# Patient Record
Sex: MALE | Race: WHITE | NOT HISPANIC OR LATINO | Employment: OTHER | URBAN - NONMETROPOLITAN AREA
[De-identification: names, ages, dates, MRNs, and addresses within clinical notes are randomized per-mention and may not be internally consistent; named-entity substitution may affect disease eponyms.]

---

## 2017-01-10 ENCOUNTER — OFFICE VISIT (OUTPATIENT)
Dept: FAMILY MEDICINE CLINIC | Facility: CLINIC | Age: 82
End: 2017-01-10

## 2017-01-10 VITALS
HEIGHT: 72 IN | HEART RATE: 71 BPM | SYSTOLIC BLOOD PRESSURE: 130 MMHG | DIASTOLIC BLOOD PRESSURE: 80 MMHG | WEIGHT: 209 LBS | BODY MASS INDEX: 28.31 KG/M2 | OXYGEN SATURATION: 95 %

## 2017-01-10 DIAGNOSIS — G25.81 RESTLESS LEG: Primary | ICD-10-CM

## 2017-01-10 DIAGNOSIS — J31.0 CHRONIC RHINITIS: ICD-10-CM

## 2017-01-10 PROBLEM — C61 PROSTATE CANCER (HCC): Status: ACTIVE | Noted: 2017-01-10

## 2017-01-10 PROCEDURE — 99213 OFFICE O/P EST LOW 20 MIN: CPT | Performed by: FAMILY MEDICINE

## 2017-01-10 RX ORDER — AMITRIPTYLINE HYDROCHLORIDE 25 MG/1
25 TABLET, FILM COATED ORAL NIGHTLY
Qty: 30 TABLET | Refills: 5 | Status: SHIPPED | OUTPATIENT
Start: 2017-01-10 | End: 2017-02-27

## 2017-01-10 RX ORDER — FLUTICASONE PROPIONATE 50 MCG
SPRAY, SUSPENSION (ML) NASAL
Qty: 1 EACH | Refills: 5 | Status: SHIPPED | OUTPATIENT
Start: 2017-01-10 | End: 2017-08-10

## 2017-01-10 RX ORDER — TAMSULOSIN HYDROCHLORIDE 0.4 MG/1
0.4 CAPSULE ORAL DAILY
COMMUNITY
Start: 2016-12-23 | End: 2017-02-27 | Stop reason: SDUPTHER

## 2017-01-10 NOTE — PROGRESS NOTES
Subjective   Mathew Ely is a 83 y.o. male.     History of Present Illness   83-year-old male with a classic history of pain in both legs at night.  Getting up and walking seems to help.  He brought this to the attention of his urologist, who is now treating him for prostate cancer.  He was advised to see me today.    Chronic rhinitis.  Fluticasone helps.  States he needs a refill.    The following portions of the patient's history were reviewed and updated as appropriate: allergies, current medications, past family history, past medical history, past social history, past surgical history and problem list.    Review of Systems   Constitutional: Negative for activity change, chills and fever.   HENT: Positive for postnasal drip and rhinorrhea.    Musculoskeletal: Positive for myalgias.   Skin: Negative for rash.       Objective   Physical Exam   Constitutional: He is oriented to person, place, and time. He appears well-nourished.   HENT:   Right Ear: External ear normal.   Left Ear: External ear normal.   Eyes: EOM are normal.   Neck: Neck supple.   Pulmonary/Chest: Effort normal.   Neurological: He is alert and oriented to person, place, and time.   Vitals reviewed.      Assessment/Plan   Mathew was seen today for restless legs syndrome.    Diagnoses and all orders for this visit:    Restless leg  -     amitriptyline (ELAVIL) 25 MG tablet; Take 1 tablet by mouth Every Night.    Chronic rhinitis  -     fluticasone (FLONASE) 50 MCG/ACT nasal spray; 1-2 inhalations daily for allergies      Having leg cramps at night.  Getting up will relieve the pain.  Recently diagnosed with prostate cancer and is being seen by Dr. Dangelo.  He advised Mr. Ely to come see me.  Trial of amitriptyline.  Take 1 hour before bed.  I think overall this will be a safe drug use.    Chronic rhinitis.  Needs a refill of his fluticasone nasal.

## 2017-01-10 NOTE — MR AVS SNAPSHOT
Mathew LAYNE Solis   1/10/2017 3:15 PM   Office Visit    Dept Phone:  [phone]   Encounter #:  [id number]    Provider:  Mathew Hernández MD   Department:  [facility]                Your Full Care Plan              Today's Medication Changes          These changes are accurate as of: 1/10/17  3:30 PM.  If you have any questions, ask your nurse or doctor.               New Medication(s)Ordered:     amitriptyline 25 MG tablet   Commonly known as:  ELAVIL   Take 1 tablet by mouth Every Night.   Started by:  Mathew Hernández MD       fluticasone 50 MCG/ACT nasal spray   Commonly known as:  FLONASE   1-2 inhalations daily for allergies   Started by:  Mathew Hernández MD            Where to Get Your Medications      These medications were sent to [facility] [address] [place] [address] [address]  - [phone]  - [phone]   [address] [facility] [address]     Phone:  [phone]     amitriptyline 25 MG tablet    fluticasone 50 MCG/ACT nasal spray                  Your Updated Medication List          This list is accurate as of: 1/10/17  3:30 PM.  Always use your most recent med list.                ALPRAZolam 0.5 MG tablet   Commonly known as:  XANAX   Take 1 tablet by mouth Daily.       amitriptyline 25 MG tablet   Commonly known as:  ELAVIL   Take 1 tablet by mouth Every Night.       amLODIPine 2.5 MG tablet   Commonly known as:  NORVASC   Take 1 tablet by mouth Daily.       aspirin 81 MG EC tablet       bisoprolol-hydrochlorothiazide 5-6.25 MG per tablet   Commonly known as:  ZIAC   Take 1 tablet by mouth daily.       diclofenac 1 % gel gel   Commonly known as:  VOLTAREN   Apply 4 g topically 4 (Four) Times a Day.       fluconazole 100 MG tablet   Commonly known as:  DIFLUCAN   Take twice a week for 4 weeks       fluticasone 50 MCG/ACT nasal spray   Commonly known as:  FLONASE   1-2 inhalations daily for allergies       "HYDROcodone-acetaminophen  MG per tablet   Commonly known as:  NORCO       lansoprazole 30 MG capsule   Commonly known as:  PREVACID   Take 1 capsule by mouth 2 (Two) Times a Day.       NITROSTAT 0.4 MG SL tablet   Generic drug:  nitroglycerin       tamsulosin 0.4 MG capsule 24 hr capsule   Commonly known as:  FLOMAX               You Were Diagnosed With        Codes Comments    Restless leg    -  Primary ICD-10-CM: G25.81  ICD-9-CM: 333.94     Chronic rhinitis     ICD-10-CM: J31.0  ICD-9-CM: 472.0       Instructions     None    Patient Instructions History      Upcoming Appointments     Visit Type Date Time Department    OFFICE VISIT 1/10/2017  3:15 PM [facility] [employer] Signup     Our records indicate that you have declined [employer] signup. If you would like to sign up for [employer], please email [email or web address] or call [phone] to obtain an activation code.             Other Info from Your Visit           Allergies     Betadine [Povidone Iodine] Allergy Rash    Penicillins Allergy Rash    Tramadol Intolerance GI Intolerance    Tylox [Oxycodone-acetaminophen] Intolerance GI Intolerance      Reason for Visit     Restless Legs Syndrome           Vital Signs     Blood Pressure Pulse Height Weight Oxygen Saturation Body Mass Index    130/80 (BP Location: Left arm, Patient Position: Sitting) 71 72\" (182.9 cm) 209 lb (94.8 kg) 95% 28.35 kg/m2    Smoking Status                   Never Smoker           Problems and Diagnoses Noted     Chronic inflammation of the nose    Prostate cancer    Restless leg        "

## 2017-01-26 ENCOUNTER — OFFICE VISIT (OUTPATIENT)
Dept: FAMILY MEDICINE CLINIC | Facility: CLINIC | Age: 82
End: 2017-01-26

## 2017-01-26 VITALS
DIASTOLIC BLOOD PRESSURE: 80 MMHG | HEIGHT: 72 IN | WEIGHT: 207 LBS | HEART RATE: 95 BPM | BODY MASS INDEX: 28.04 KG/M2 | OXYGEN SATURATION: 97 % | SYSTOLIC BLOOD PRESSURE: 140 MMHG

## 2017-01-26 DIAGNOSIS — L30.9 DERMATITIS: Primary | ICD-10-CM

## 2017-01-26 PROCEDURE — 99213 OFFICE O/P EST LOW 20 MIN: CPT | Performed by: FAMILY MEDICINE

## 2017-01-26 RX ORDER — FLUCONAZOLE 100 MG/1
TABLET ORAL
Qty: 8 TABLET | Refills: 1 | Status: SHIPPED | OUTPATIENT
Start: 2017-01-26 | End: 2017-02-27

## 2017-01-26 NOTE — MR AVS SNAPSHOT
Mathew Ely   1/26/2017 3:30 PM   Office Visit    Dept Phone:  [phone]   Encounter #:  [id number]    Provider:  Mathew Hernández MD   Department:  [facility]                Your Full Care Plan              Where to Get Your Medications      These medications were sent to [facility] [address] [place] [address] - [address] - [phone]  - [phone]  [address], [facility] [address]     Phone:  [phone]     fluconazole 100 MG tablet            Your Updated Medication List          This list is accurate as of: 1/26/17  4:14 PM.  Always use your most recent med list.                ALPRAZolam 0.5 MG tablet   Commonly known as:  XANAX   Take 1 tablet by mouth Daily.       amitriptyline 25 MG tablet   Commonly known as:  ELAVIL   Take 1 tablet by mouth Every Night.       amLODIPine 2.5 MG tablet   Commonly known as:  NORVASC   Take 1 tablet by mouth Daily.       aspirin 81 MG EC tablet       bisoprolol-hydrochlorothiazide 5-6.25 MG per tablet   Commonly known as:  ZIAC   Take 1 tablet by mouth daily.       diclofenac 1 % gel gel   Commonly known as:  VOLTAREN   Apply 4 g topically 4 (Four) Times a Day.       fluconazole 100 MG tablet   Commonly known as:  DIFLUCAN   Take twice a week for 4 weeks       fluticasone 50 MCG/ACT nasal spray   Commonly known as:  FLONASE   1-2 inhalations daily for allergies       HYDROcodone-acetaminophen  MG per tablet   Commonly known as:  NORCO       lansoprazole 30 MG capsule   Commonly known as:  PREVACID   Take 1 capsule by mouth 2 (Two) Times a Day.       NITROSTAT 0.4 MG SL tablet   Generic drug:  nitroglycerin       tamsulosin 0.4 MG capsule 24 hr capsule   Commonly known as:  FLOMAX               You Were Diagnosed With        Codes Comments    Dermatitis    -  Primary ICD-10-CM: L30.9  ICD-9-CM: 692.9       Instructions     None    Patient Instructions History      Upcoming Appointments  "    Visit Type Date Time Department    SAME DAY 1/26/2017  3:30 PM [facility]      MyChart Signup     Our records indicate that you have declined [facility] [employer] signup. If you would like to sign up for [employer], please email [email or web address] or call [phone] to obtain an activation code.             Other Info from Your Visit           Allergies     Betadine [Povidone Iodine] Allergy Rash    Penicillins Allergy Rash    Tramadol Intolerance GI Intolerance    Tylox [Oxycodone-acetaminophen] Intolerance GI Intolerance      Reason for Visit     Rash           Vital Signs     Blood Pressure Pulse Height Weight Oxygen Saturation Body Mass Index    140/80 (BP Location: Left arm, Patient Position: Sitting) 95 72\" (182.9 cm) 207 lb (93.9 kg) 97% 28.07 kg/m2    Smoking Status                   Never Smoker           Problems and Diagnoses Noted     Dermatitis    -  Primary        "

## 2017-01-30 RX ORDER — AMLODIPINE BESYLATE 2.5 MG/1
2.5 TABLET ORAL DAILY
Qty: 90 TABLET | Refills: 1 | Status: SHIPPED | OUTPATIENT
Start: 2017-01-30 | End: 2017-02-14 | Stop reason: SDUPTHER

## 2017-02-14 ENCOUNTER — TELEPHONE (OUTPATIENT)
Dept: FAMILY MEDICINE CLINIC | Facility: CLINIC | Age: 82
End: 2017-02-14

## 2017-02-14 DIAGNOSIS — F51.04 CHRONIC INSOMNIA: ICD-10-CM

## 2017-02-14 RX ORDER — AMLODIPINE BESYLATE 2.5 MG/1
2.5 TABLET ORAL DAILY
Qty: 90 TABLET | Refills: 1 | Status: SHIPPED | OUTPATIENT
Start: 2017-02-14 | End: 2017-08-10 | Stop reason: SDUPTHER

## 2017-02-14 RX ORDER — BISOPROLOL FUMARATE AND HYDROCHLOROTHIAZIDE 5; 6.25 MG/1; MG/1
1 TABLET ORAL DAILY
Qty: 90 TABLET | Refills: 1 | Status: SHIPPED | OUTPATIENT
Start: 2017-02-14 | End: 2017-08-04 | Stop reason: SDUPTHER

## 2017-02-14 RX ORDER — ALPRAZOLAM 0.5 MG/1
0.5 TABLET ORAL DAILY
Qty: 90 TABLET | Refills: 1 | Status: SHIPPED | OUTPATIENT
Start: 2017-02-14 | End: 2017-05-24 | Stop reason: SDUPTHER

## 2017-02-14 NOTE — TELEPHONE ENCOUNTER
Kaity called and requested a refill of Alprazolam 0.5 mg QD [facility] 11/7/2016 last seen 1/26/2017 no fup

## 2017-02-27 ENCOUNTER — OFFICE VISIT (OUTPATIENT)
Dept: FAMILY MEDICINE CLINIC | Facility: CLINIC | Age: 82
End: 2017-02-27

## 2017-02-27 VITALS
OXYGEN SATURATION: 98 % | BODY MASS INDEX: 28.04 KG/M2 | TEMPERATURE: 98.2 F | DIASTOLIC BLOOD PRESSURE: 72 MMHG | HEIGHT: 72 IN | SYSTOLIC BLOOD PRESSURE: 120 MMHG | WEIGHT: 207 LBS | HEART RATE: 62 BPM

## 2017-02-27 DIAGNOSIS — C61 PROSTATE CANCER (HCC): ICD-10-CM

## 2017-02-27 DIAGNOSIS — J31.0 CHRONIC RHINITIS: Primary | ICD-10-CM

## 2017-02-27 PROCEDURE — 99213 OFFICE O/P EST LOW 20 MIN: CPT | Performed by: FAMILY MEDICINE

## 2017-02-27 PROCEDURE — 96372 THER/PROPH/DIAG INJ SC/IM: CPT | Performed by: FAMILY MEDICINE

## 2017-02-27 RX ORDER — TAMSULOSIN HYDROCHLORIDE 0.4 MG/1
0.4 CAPSULE ORAL DAILY
Qty: 90 CAPSULE | Refills: 3 | Status: SHIPPED | OUTPATIENT
Start: 2017-02-27 | End: 2017-05-24 | Stop reason: SDUPTHER

## 2017-02-27 RX ORDER — METHYLPREDNISOLONE ACETATE 80 MG/ML
80 INJECTION, SUSPENSION INTRA-ARTICULAR; INTRALESIONAL; INTRAMUSCULAR; SOFT TISSUE ONCE
Status: COMPLETED | OUTPATIENT
Start: 2017-02-27 | End: 2017-02-27

## 2017-02-27 RX ADMIN — METHYLPREDNISOLONE ACETATE 80 MG: 80 INJECTION, SUSPENSION INTRA-ARTICULAR; INTRALESIONAL; INTRAMUSCULAR; SOFT TISSUE at 16:33

## 2017-02-27 NOTE — PROGRESS NOTES
Subjective   Mathew Ely is a 83 y.o. male.     History of Present Illness   83-year-old male.  History of chronic rhinitis.  Takes Zyrtec without much help and fluticasone.  Also uses nasal saline.  Head congestion and drainage.  Eyes feel red and sore.    Prostate.  Has got relief with tamsulosin.  Followed by Dr. Dangelo.  Requests refill    The following portions of the patient's history were reviewed and updated as appropriate: allergies, current medications, past family history, past medical history, past social history, past surgical history and problem list.    Review of Systems   Constitutional: Negative for chills and fever.   HENT: Positive for congestion, postnasal drip, rhinorrhea, sinus pressure and sneezing. Negative for facial swelling.    Genitourinary: Positive for frequency.       Objective   Physical Exam   Constitutional: He appears well-developed and well-nourished.   HENT:   Right Ear: External ear normal.   Left Ear: External ear normal.   Mouth/Throat: Oropharynx is clear and moist.   Eyes: EOM are normal. Pupils are equal, round, and reactive to light.   Neck: Neck supple.   Pulmonary/Chest: Effort normal.   Neurological: He is alert.   Vitals reviewed.      Assessment/Plan   Mathew was seen today for allergies.    Diagnoses and all orders for this visit:    Chronic rhinitis  -     methylPREDNISolone acetate (DEPO-medrol) injection 80 mg; Inject 1 mL into the shoulder, thigh, or buttocks 1 (One) Time.    Prostate cancer    Other orders  -     tamsulosin (FLOMAX) 0.4 MG capsule 24 hr capsule; Take 1 capsule by mouth Daily.      Chronic rhinitis.  Taking Zyrtec and fluticasone nasal.  This is been an old recurrent problem.  Specially worse now.  Depo-Medrol.  We'll see if this will at least give him some relief.    Prostate.  History of cancer.  Takes tamsulosin.  This has helped his ability to void.  Would like a refill.    Addendum.  Vinicius is here with his wife.  She has seen Dr. Gomez  is going to get a lipid panel in the morning.  They want, to have a lipid panel to put it off for a little while.  I reviewed his chart and noticed that he had some problems with his electrolytes.  I think this is basically been resolved but I may need to look at this again.

## 2017-03-13 ENCOUNTER — OFFICE VISIT (OUTPATIENT)
Dept: FAMILY MEDICINE CLINIC | Facility: CLINIC | Age: 82
End: 2017-03-13

## 2017-03-13 VITALS
TEMPERATURE: 97.8 F | HEIGHT: 72 IN | DIASTOLIC BLOOD PRESSURE: 80 MMHG | WEIGHT: 205 LBS | BODY MASS INDEX: 27.77 KG/M2 | SYSTOLIC BLOOD PRESSURE: 120 MMHG | OXYGEN SATURATION: 97 % | HEART RATE: 53 BPM

## 2017-03-13 DIAGNOSIS — J31.0 CHRONIC RHINITIS: ICD-10-CM

## 2017-03-13 DIAGNOSIS — J31.0 CHRONIC RHINITIS: Primary | ICD-10-CM

## 2017-03-13 PROCEDURE — 99213 OFFICE O/P EST LOW 20 MIN: CPT | Performed by: FAMILY MEDICINE

## 2017-03-13 RX ORDER — AZELASTINE HYDROCHLORIDE, FLUTICASONE PROPIONATE 137; 50 UG/1; UG/1
SPRAY, METERED NASAL
Qty: 1 BOTTLE | Refills: 5 | Status: SHIPPED | OUTPATIENT
Start: 2017-03-13 | End: 2017-03-13 | Stop reason: SDUPTHER

## 2017-03-13 RX ORDER — AZELASTINE HYDROCHLORIDE, FLUTICASONE PROPIONATE 137; 50 UG/1; UG/1
SPRAY, METERED NASAL
Qty: 3 BOTTLE | Refills: 1 | Status: SHIPPED | OUTPATIENT
Start: 2017-03-13 | End: 2017-08-10 | Stop reason: SDUPTHER

## 2017-03-13 NOTE — PROGRESS NOTES
Subjective   Mathew Ely is a 83 y.o. male.     History of Present Illness   83-year-old male.  Chief complaint is rhinitis.  He does have chronic rhinitis.  He takes fluticasone.  Was given a steroid shot but it didn't seem to help.  Pain is not really a problem but sleeping is.  States he is constantly clearing his throat at night..  The following portions of the patient's history were reviewed and updated as appropriate: allergies, current medications, past family history, past medical history, past social history, past surgical history and problem list.    Review of Systems   Constitutional: Negative for fever.   HENT: Positive for congestion and postnasal drip. Negative for sneezing.        Objective   Physical Exam   Constitutional: He appears well-developed and well-nourished.   HENT:   Right Ear: External ear normal.   Left Ear: External ear normal.   Mouth/Throat: Oropharynx is clear and moist.   Eyes: EOM are normal. Pupils are equal, round, and reactive to light.   Neck: Neck supple.   Cardiovascular: Normal rate, regular rhythm and normal heart sounds.    Pulmonary/Chest: Effort normal and breath sounds normal.   Vitals reviewed.      Assessment/Plan   Mathew was seen today for sinus problem.    Diagnoses and all orders for this visit:    Chronic rhinitis  -     Azelastine-Fluticasone 137-50 MCG/ACT suspension; 1 inhalation q nostril bid    Not sure what else to do for this.  We'll try holding the Flonase and substituting Dymista.

## 2017-04-07 RX ORDER — LANSOPRAZOLE 30 MG/1
30 CAPSULE, DELAYED RELEASE ORAL 2 TIMES DAILY
Qty: 180 CAPSULE | Refills: 1 | Status: SHIPPED | OUTPATIENT
Start: 2017-04-07 | End: 2017-06-14 | Stop reason: SDUPTHER

## 2017-04-20 ENCOUNTER — OFFICE VISIT (OUTPATIENT)
Dept: FAMILY MEDICINE CLINIC | Facility: CLINIC | Age: 82
End: 2017-04-20

## 2017-04-20 VITALS
WEIGHT: 205 LBS | BODY MASS INDEX: 27.77 KG/M2 | OXYGEN SATURATION: 95 % | HEIGHT: 72 IN | SYSTOLIC BLOOD PRESSURE: 122 MMHG | TEMPERATURE: 97.5 F | DIASTOLIC BLOOD PRESSURE: 70 MMHG | HEART RATE: 64 BPM
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
## 2017-04-20 PROCEDURE — 99213 OFFICE O/P EST LOW 20 MIN: CPT | Performed by: FAMILY MEDICINE

## 2017-04-20 RX ORDER — DOXYCYCLINE HYCLATE 100 MG/1
100 TABLET, DELAYED RELEASE ORAL 2 TIMES DAILY
Qty: 10 TABLET | Refills: 0 | Status: SHIPPED | OUTPATIENT
Start: 2017-04-20 | End: 2017-07-25

## 2017-04-20 NOTE — PROGRESS NOTES
Subjective   Mathew Ely is a 83 y.o. male.     History of Present Illness   83-year-old male.  Has pulled 3 ticks off.  States that were so small he could barely see them.  Inserted about Lyme disease.  No widespread rashes except at the site where the tick bite was.  The following portions of the patient's history were reviewed and updated as appropriate: allergies, current medications, past family history, past medical history, past social history, past surgical history and problem list.    Review of Systems   Constitutional: Negative for chills, fatigue and fever.   Skin: Positive for wound. Negative for rash.       Objective   Physical Exam   Constitutional: He is oriented to person, place, and time. He appears well-nourished.   HENT:   Head: Normocephalic.   Pulmonary/Chest: Effort normal.   Neurological: He is alert and oriented to person, place, and time.   Skin: Skin is warm.        Psychiatric: He has a normal mood and affect.       Assessment/Plan   Mathew was seen today for tick removal.    Diagnoses and all orders for this visit:    Tick bite, initial encounter  -     doxycycline (DORYX) 100 MG enteric coated tablet; Take 1 tablet by mouth 2 (Two) Times a Day.    Very worried about Lyme disease.  We'll go ahead and put him on doxycycline.

## 2017-05-24 DIAGNOSIS — F51.04 CHRONIC INSOMNIA: ICD-10-CM

## 2017-05-24 RX ORDER — ALPRAZOLAM 0.5 MG/1
0.5 TABLET ORAL DAILY
Qty: 90 TABLET | Refills: 1 | Status: SHIPPED | OUTPATIENT
Start: 2017-05-24 | End: 2017-08-22 | Stop reason: SDUPTHER

## 2017-05-24 RX ORDER — TAMSULOSIN HYDROCHLORIDE 0.4 MG/1
0.4 CAPSULE ORAL DAILY
Qty: 90 CAPSULE | Refills: 3 | Status: SHIPPED | OUTPATIENT
Start: 2017-05-24

## 2017-05-25 ENCOUNTER — OFFICE VISIT (OUTPATIENT)
Dept: FAMILY MEDICINE CLINIC | Facility: CLINIC | Age: 82
End: 2017-05-25

## 2017-05-25 VITALS
BODY MASS INDEX: 27.79 KG/M2 | HEIGHT: 72 IN | WEIGHT: 205.2 LBS | DIASTOLIC BLOOD PRESSURE: 78 MMHG | SYSTOLIC BLOOD PRESSURE: 140 MMHG | HEART RATE: 73 BPM | TEMPERATURE: 97.6 F | OXYGEN SATURATION: 98 %

## 2017-05-25 DIAGNOSIS — M77.41 METATARSALGIA OF RIGHT FOOT: Primary | ICD-10-CM

## 2017-05-25 PROCEDURE — 99213 OFFICE O/P EST LOW 20 MIN: CPT | Performed by: FAMILY MEDICINE

## 2017-05-25 PROCEDURE — 20550 NJX 1 TENDON SHEATH/LIGAMENT: CPT | Performed by: FAMILY MEDICINE

## 2017-05-25 RX ORDER — METHYLPREDNISOLONE ACETATE 80 MG/ML
80 INJECTION, SUSPENSION INTRA-ARTICULAR; INTRALESIONAL; INTRAMUSCULAR; SOFT TISSUE ONCE
Status: COMPLETED | OUTPATIENT
Start: 2017-05-25 | End: 2017-05-25

## 2017-05-25 RX ADMIN — METHYLPREDNISOLONE ACETATE 80 MG: 80 INJECTION, SUSPENSION INTRA-ARTICULAR; INTRALESIONAL; INTRAMUSCULAR; SOFT TISSUE at 16:52

## 2017-06-14 RX ORDER — LANSOPRAZOLE 30 MG/1
30 CAPSULE, DELAYED RELEASE ORAL 2 TIMES DAILY
Qty: 180 CAPSULE | Refills: 1 | Status: SHIPPED | OUTPATIENT
Start: 2017-06-14

## 2017-06-14 NOTE — TELEPHONE ENCOUNTER
Kaity asked if we could send in rosio's [facility]ER[facility]. I will send this in.

## 2017-07-25 ENCOUNTER — OFFICE VISIT (OUTPATIENT)
Dept: FAMILY MEDICINE CLINIC | Facility: CLINIC | Age: 82
End: 2017-07-25

## 2017-07-25 VITALS
BODY MASS INDEX: 27.5 KG/M2 | HEART RATE: 69 BPM | WEIGHT: 203 LBS | SYSTOLIC BLOOD PRESSURE: 120 MMHG | HEIGHT: 72 IN | DIASTOLIC BLOOD PRESSURE: 70 MMHG | OXYGEN SATURATION: 96 %

## 2017-07-25 DIAGNOSIS — H60.502 ACUTE OTITIS EXTERNA OF LEFT EAR, UNSPECIFIED TYPE: Primary | ICD-10-CM

## 2017-07-25 PROCEDURE — 99212 OFFICE O/P EST SF 10 MIN: CPT | Performed by: FAMILY MEDICINE

## 2017-07-25 NOTE — PROGRESS NOTES
Subjective   Mathew Ely is a 83 y.o. male.     History of Present Illness   83-year-old male who was treated in the emergency room for an otitis externa.  Given Ciprodex.  Taking 4 drops twice a day.  States he is doing much better.  The following portions of the patient's history were reviewed and updated as appropriate: allergies, current medications, past family history, past medical history, past social history, past surgical history and problem list.    Review of Systems   Constitutional: Negative for fever.   HENT: Negative for congestion, ear discharge and ear pain.        Objective   Physical Exam   Constitutional: He appears well-nourished.   HENT:   Head: Normocephalic.   Right Ear: External ear normal.   Left Ear: External ear and ear canal normal. No drainage, swelling or tenderness. No foreign bodies. Tympanic membrane is not injected, not perforated and not erythematous.       Assessment/Plan   Mathew was seen today for earache.    Diagnoses and all orders for this visit:    Acute otitis externa of left ear, unspecified type    Successfully treated for no otitis externa.  There may be slight redness to the left canal but it is 90-95% better.  Problem is essentially resolved.  Take medicine till they're gone

## 2017-08-04 RX ORDER — BISOPROLOL FUMARATE AND HYDROCHLOROTHIAZIDE 5; 6.25 MG/1; MG/1
1 TABLET ORAL DAILY
Qty: 90 TABLET | Refills: 1 | Status: SHIPPED | OUTPATIENT
Start: 2017-08-04 | End: 2020-01-01

## 2017-08-10 DIAGNOSIS — J31.0 CHRONIC RHINITIS: ICD-10-CM

## 2017-08-10 RX ORDER — AMLODIPINE BESYLATE 2.5 MG/1
2.5 TABLET ORAL DAILY
Qty: 90 TABLET | Refills: 1 | Status: SHIPPED | OUTPATIENT
Start: 2017-08-10 | End: 2020-01-01

## 2017-08-10 RX ORDER — AZELASTINE HYDROCHLORIDE, FLUTICASONE PROPIONATE 137; 50 UG/1; UG/1
SPRAY, METERED NASAL
Qty: 3 BOTTLE | Refills: 1 | Status: SHIPPED | OUTPATIENT
Start: 2017-08-10 | End: 2020-01-01

## 2017-08-22 DIAGNOSIS — F51.04 CHRONIC INSOMNIA: ICD-10-CM

## 2017-08-22 RX ORDER — ALPRAZOLAM 0.5 MG/1
0.5 TABLET ORAL DAILY
Qty: 90 TABLET | Refills: 1 | Status: SHIPPED | OUTPATIENT
Start: 2017-08-22 | End: 2020-01-01

## 2017-08-25 ENCOUNTER — LAB (OUTPATIENT)
Dept: FAMILY MEDICINE CLINIC | Facility: CLINIC | Age: 82
End: 2017-08-25

## 2017-08-25 DIAGNOSIS — E78.2 HYPERLIPIDEMIA, MIXED: Primary | ICD-10-CM

## 2017-08-25 PROCEDURE — 36415 COLL VENOUS BLD VENIPUNCTURE: CPT | Performed by: FAMILY MEDICINE

## 2017-08-26 LAB
CHOLEST SERPL-MCNC: 155 MG/DL (ref 100–199)
HDLC SERPL-MCNC: 51 MG/DL
LDLC SERPL CALC-MCNC: 93 MG/DL (ref 0–99)
TRIGL SERPL-MCNC: 54 MG/DL (ref 0–149)
VLDLC SERPL CALC-MCNC: 11 MG/DL (ref 5–40)

## 2017-08-28 ENCOUNTER — TELEPHONE (OUTPATIENT)
Dept: FAMILY MEDICINE CLINIC | Facility: CLINIC | Age: 82
End: 2017-08-28

## 2017-08-28 NOTE — PROGRESS NOTES
Call.  I have the results of the lipid panel.  The total cholesterol is 155 which is very good.  Triglycerides which we would like below 150 are 54.  The HDL, or good cholesterol, is 51 and would like this above 40.  The bad cholesterol, LDL, is 93 and we would like this below 100.  One year ago the LDL was 66.  Overall, with say this profile is very good

## 2017-08-28 NOTE — TELEPHONE ENCOUNTER
----- Message from Mathew Hernández MD sent at 8/28/2017  7:57 AM EDT -----  Call.  I have the results of the lipid panel.  The total cholesterol is 155 which is very good.  Triglycerides which we would like below 150 are 54.  The HDL, or good cholesterol, is 51 and would like this above 40.  The bad cholesterol, LDL, is 93 and we would like this below 100.  One year ago the LDL was 66.  Overall, with say this profile is very good

## 2020-01-01 ENCOUNTER — APPOINTMENT (OUTPATIENT)
Dept: ULTRASOUND IMAGING | Facility: HOSPITAL | Age: 85
End: 2020-01-01

## 2020-01-01 ENCOUNTER — APPOINTMENT (OUTPATIENT)
Dept: CT IMAGING | Facility: HOSPITAL | Age: 85
End: 2020-01-01

## 2020-01-01 ENCOUNTER — APPOINTMENT (OUTPATIENT)
Dept: GENERAL RADIOLOGY | Facility: HOSPITAL | Age: 85
End: 2020-01-01

## 2020-01-01 ENCOUNTER — APPOINTMENT (OUTPATIENT)
Dept: CARDIOLOGY | Facility: HOSPITAL | Age: 85
End: 2020-01-01

## 2020-01-01 ENCOUNTER — APPOINTMENT (OUTPATIENT)
Dept: MRI IMAGING | Facility: HOSPITAL | Age: 85
End: 2020-01-01

## 2020-01-01 ENCOUNTER — HOSPITAL ENCOUNTER (INPATIENT)
Facility: HOSPITAL | Age: 85
LOS: 8 days | End: 2020-05-29
Attending: EMERGENCY MEDICINE | Admitting: INTERNAL MEDICINE

## 2020-01-01 VITALS
OXYGEN SATURATION: 90 % | TEMPERATURE: 95.2 F | SYSTOLIC BLOOD PRESSURE: 117 MMHG | DIASTOLIC BLOOD PRESSURE: 74 MMHG | HEIGHT: 72 IN | BODY MASS INDEX: 30.34 KG/M2 | WEIGHT: 224 LBS | RESPIRATION RATE: 20 BRPM

## 2020-01-01 DIAGNOSIS — M51.26 LUMBAR HERNIATED DISC: ICD-10-CM

## 2020-01-01 DIAGNOSIS — R18.8 OTHER ASCITES: ICD-10-CM

## 2020-01-01 DIAGNOSIS — I44.7 LBBB (LEFT BUNDLE BRANCH BLOCK): ICD-10-CM

## 2020-01-01 DIAGNOSIS — J20.8 ACUTE BRONCHITIS DUE TO OTHER SPECIFIED ORGANISMS: ICD-10-CM

## 2020-01-01 DIAGNOSIS — R47.1 DYSARTHRIA: ICD-10-CM

## 2020-01-01 DIAGNOSIS — R11.2 NON-INTRACTABLE VOMITING WITH NAUSEA, UNSPECIFIED VOMITING TYPE: ICD-10-CM

## 2020-01-01 DIAGNOSIS — I50.9 ACUTE ON CHRONIC CONGESTIVE HEART FAILURE, UNSPECIFIED HEART FAILURE TYPE (HCC): Primary | ICD-10-CM

## 2020-01-01 DIAGNOSIS — I50.43 ACUTE ON CHRONIC COMBINED SYSTOLIC AND DIASTOLIC CONGESTIVE HEART FAILURE (HCC): ICD-10-CM

## 2020-01-01 DIAGNOSIS — R41.841 COGNITIVE COMMUNICATION DEFICIT: ICD-10-CM

## 2020-01-01 DIAGNOSIS — N17.9 ACUTE RENAL FAILURE, UNSPECIFIED ACUTE RENAL FAILURE TYPE (HCC): ICD-10-CM

## 2020-01-01 LAB
ACTIN IGG SERPL-ACNC: 8 UNITS (ref 0–19)
ALBUMIN SERPL-MCNC: 3.6 G/DL (ref 3.5–5.2)
ALBUMIN SERPL-MCNC: 3.7 G/DL (ref 3.5–5.2)
ALBUMIN SERPL-MCNC: 3.8 G/DL (ref 3.5–5.2)
ALBUMIN SERPL-MCNC: 3.8 G/DL (ref 3.5–5.2)
ALBUMIN SERPL-MCNC: 3.9 G/DL (ref 3.5–5.2)
ALBUMIN SERPL-MCNC: 4 G/DL (ref 2.9–4.4)
ALBUMIN SERPL-MCNC: 4 G/DL (ref 3.5–5.2)
ALBUMIN/GLOB SERPL: 1.1 G/DL
ALBUMIN/GLOB SERPL: 1.2 G/DL
ALBUMIN/GLOB SERPL: 1.2 G/DL
ALBUMIN/GLOB SERPL: 1.3 G/DL
ALBUMIN/GLOB SERPL: 1.3 G/DL
ALBUMIN/GLOB SERPL: 1.4 G/DL
ALBUMIN/GLOB SERPL: 1.4 {RATIO} (ref 0.7–1.7)
ALBUMIN/GLOB SERPL: 1.5 G/DL
ALP SERPL-CCNC: 123 U/L (ref 39–117)
ALP SERPL-CCNC: 144 U/L (ref 39–117)
ALP SERPL-CCNC: 189 U/L (ref 39–117)
ALP SERPL-CCNC: 261 U/L (ref 39–117)
ALP SERPL-CCNC: 282 U/L (ref 39–117)
ALP SERPL-CCNC: 296 U/L (ref 39–117)
ALP SERPL-CCNC: 78 U/L (ref 39–117)
ALP SERPL-CCNC: 79 U/L (ref 39–117)
ALPHA1 GLOB FLD ELPH-MCNC: 0.3 G/DL (ref 0–0.4)
ALPHA2 GLOB SERPL ELPH-MCNC: 0.7 G/DL (ref 0.4–1)
ALT SERPL W P-5'-P-CCNC: 1691 U/L (ref 1–41)
ALT SERPL W P-5'-P-CCNC: 175 U/L (ref 1–41)
ALT SERPL W P-5'-P-CCNC: 2283 U/L (ref 1–41)
ALT SERPL W P-5'-P-CCNC: 244 U/L (ref 1–41)
ALT SERPL W P-5'-P-CCNC: 391 U/L (ref 1–41)
ALT SERPL W P-5'-P-CCNC: 525 U/L (ref 1–41)
ALT SERPL W P-5'-P-CCNC: 608 U/L (ref 1–41)
ALT SERPL W P-5'-P-CCNC: 952 U/L (ref 1–41)
AMMONIA BLD-SCNC: 32 UMOL/L (ref 16–60)
ANA SER QL: NEGATIVE
ANION GAP SERPL CALCULATED.3IONS-SCNC: 15 MMOL/L (ref 5–15)
ANION GAP SERPL CALCULATED.3IONS-SCNC: 15 MMOL/L (ref 5–15)
ANION GAP SERPL CALCULATED.3IONS-SCNC: 17 MMOL/L (ref 5–15)
ANION GAP SERPL CALCULATED.3IONS-SCNC: 18 MMOL/L (ref 5–15)
ANION GAP SERPL CALCULATED.3IONS-SCNC: 19 MMOL/L (ref 5–15)
ANION GAP SERPL CALCULATED.3IONS-SCNC: 20 MMOL/L (ref 5–15)
ANION GAP SERPL CALCULATED.3IONS-SCNC: 20 MMOL/L (ref 5–15)
ANION GAP SERPL CALCULATED.3IONS-SCNC: 24 MMOL/L (ref 5–15)
ANION GAP SERPL CALCULATED.3IONS-SCNC: 28 MMOL/L (ref 5–15)
APTT PPP: 33.3 SECONDS (ref 50–95)
ARTERIAL PATENCY WRIST A: ABNORMAL
ARTERIAL PATENCY WRIST A: POSITIVE
AST SERPL-CCNC: 120 U/L (ref 1–40)
AST SERPL-CCNC: 153 U/L (ref 1–40)
AST SERPL-CCNC: 1668 U/L (ref 1–40)
AST SERPL-CCNC: 242 U/L (ref 1–40)
AST SERPL-CCNC: 2438 U/L (ref 1–40)
AST SERPL-CCNC: 311 U/L (ref 1–40)
AST SERPL-CCNC: 399 U/L (ref 1–40)
AST SERPL-CCNC: 684 U/L (ref 1–40)
ATMOSPHERIC PRESS: ABNORMAL MM[HG]
ATMOSPHERIC PRESS: ABNORMAL MM[HG]
B-GLOBULIN SERPL ELPH-MCNC: 0.7 G/DL (ref 0.7–1.3)
BACTERIA SPEC AEROBE CULT: ABNORMAL
BACTERIA SPEC AEROBE CULT: ABNORMAL
BACTERIA UR QL AUTO: ABNORMAL /HPF
BASE EXCESS BLDA CALC-SCNC: -12.4 MMOL/L (ref 0–2)
BASE EXCESS BLDA CALC-SCNC: -3 MMOL/L (ref 0–2)
BASOPHILS # BLD AUTO: 0.02 10*3/MM3 (ref 0–0.2)
BASOPHILS # BLD AUTO: 0.02 10*3/MM3 (ref 0–0.2)
BASOPHILS # BLD AUTO: 0.04 10*3/MM3 (ref 0–0.2)
BASOPHILS # BLD MANUAL: 0 10*3/MM3 (ref 0–0.2)
BASOPHILS # BLD MANUAL: 0 10*3/MM3 (ref 0–0.2)
BASOPHILS NFR BLD AUTO: 0 % (ref 0–1.5)
BASOPHILS NFR BLD AUTO: 0 % (ref 0–1.5)
BASOPHILS NFR BLD AUTO: 0.2 % (ref 0–1.5)
BASOPHILS NFR BLD AUTO: 0.2 % (ref 0–1.5)
BASOPHILS NFR BLD AUTO: 0.4 % (ref 0–1.5)
BDY SITE: ABNORMAL
BDY SITE: ABNORMAL
BH CV ECHO MEAS - AO ROOT AREA (BSA CORRECTED): 1.8
BH CV ECHO MEAS - AO ROOT AREA: 11.3 CM^2
BH CV ECHO MEAS - AO ROOT DIAM: 3.8 CM
BH CV ECHO MEAS - BSA(HAYCOCK): 2.2 M^2
BH CV ECHO MEAS - BSA: 2.1 M^2
BH CV ECHO MEAS - BZI_BMI: 28.2 KILOGRAMS/M^2
BH CV ECHO MEAS - BZI_METRIC_HEIGHT: 182 CM
BH CV ECHO MEAS - BZI_METRIC_WEIGHT: 93.4 KG
BH CV ECHO MEAS - EDV(CUBED): 103.8 ML
BH CV ECHO MEAS - EDV(MOD-SP2): 275 ML
BH CV ECHO MEAS - EDV(MOD-SP4): 189 ML
BH CV ECHO MEAS - EDV(TEICH): 102.4 ML
BH CV ECHO MEAS - EF(CUBED): 18 %
BH CV ECHO MEAS - EF(MOD-BP): 38.4 %
BH CV ECHO MEAS - EF(MOD-SP2): 37.1 %
BH CV ECHO MEAS - EF(MOD-SP4): 38.1 %
BH CV ECHO MEAS - EF(TEICH): 14.3 %
BH CV ECHO MEAS - ESV(CUBED): 85.2 ML
BH CV ECHO MEAS - ESV(MOD-SP2): 173 ML
BH CV ECHO MEAS - ESV(MOD-SP4): 117 ML
BH CV ECHO MEAS - ESV(TEICH): 87.7 ML
BH CV ECHO MEAS - FS: 6.4 %
BH CV ECHO MEAS - IVS/LVPW: 1.1
BH CV ECHO MEAS - IVSD: 1 CM
BH CV ECHO MEAS - LA DIMENSION: 4.8 CM
BH CV ECHO MEAS - LA/AO: 1.3
BH CV ECHO MEAS - LAD MAJOR: 7.3 CM
BH CV ECHO MEAS - LAT PEAK E' VEL: 10.6 CM/SEC
BH CV ECHO MEAS - LATERAL E/E' RATIO: 8.6
BH CV ECHO MEAS - LV DIASTOLIC VOL/BSA (35-75): 87.9 ML/M^2
BH CV ECHO MEAS - LV MASS(C)D: 153.4 GRAMS
BH CV ECHO MEAS - LV MASS(C)DI: 71.4 GRAMS/M^2
BH CV ECHO MEAS - LV MAX PG: 2.2 MMHG
BH CV ECHO MEAS - LV MEAN PG: 1 MMHG
BH CV ECHO MEAS - LV SYSTOLIC VOL/BSA (12-30): 54.4 ML/M^2
BH CV ECHO MEAS - LV V1 MAX: 74.4 CM/SEC
BH CV ECHO MEAS - LV V1 MEAN: 42.4 CM/SEC
BH CV ECHO MEAS - LV V1 VTI: 12.8 CM
BH CV ECHO MEAS - LVIDD: 4.7 CM
BH CV ECHO MEAS - LVIDS: 4.4 CM
BH CV ECHO MEAS - LVLD AP2: 9.8 CM
BH CV ECHO MEAS - LVLD AP4: 9.5 CM
BH CV ECHO MEAS - LVLS AP2: 8.6 CM
BH CV ECHO MEAS - LVLS AP4: 8.9 CM
BH CV ECHO MEAS - LVOT AREA (M): 3.1 CM^2
BH CV ECHO MEAS - LVOT AREA: 3.1 CM^2
BH CV ECHO MEAS - LVOT DIAM: 2 CM
BH CV ECHO MEAS - LVPWD: 0.9 CM
BH CV ECHO MEAS - MEDIAL E/E' RATIO: 15
BH CV ECHO MEAS - MR PISA RADIUS: 0.6 CM
BH CV ECHO MEAS - MR PISA: 2.3 CM^2
BH CV ECHO MEAS - MV DEC SLOPE: 529 CM/SEC^2
BH CV ECHO MEAS - MV DEC TIME: 0.17 SEC
BH CV ECHO MEAS - MV E MAX VEL: 91.3 CM/SEC
BH CV ECHO MEAS - PA ACC TIME: 0.07 SEC
BH CV ECHO MEAS - PA PR(ACCEL): 48.9 MMHG
BH CV ECHO MEAS - PI END-D VEL: 154.5 CM/SEC
BH CV ECHO MEAS - RAP SYSTOLE: 15 MMHG
BH CV ECHO MEAS - RVSP: 49 MMHG
BH CV ECHO MEAS - SI(CUBED): 8.7 ML/M^2
BH CV ECHO MEAS - SI(LVOT): 18.7 ML/M^2
BH CV ECHO MEAS - SI(MOD-SP2): 47.4 ML/M^2
BH CV ECHO MEAS - SI(MOD-SP4): 33.5 ML/M^2
BH CV ECHO MEAS - SI(TEICH): 6.8 ML/M^2
BH CV ECHO MEAS - SV(CUBED): 18.6 ML
BH CV ECHO MEAS - SV(LVOT): 40.2 ML
BH CV ECHO MEAS - SV(MOD-SP2): 102 ML
BH CV ECHO MEAS - SV(MOD-SP4): 72 ML
BH CV ECHO MEAS - SV(TEICH): 14.7 ML
BH CV ECHO MEAS - TAPSE (>1.6): 0.9 CM2
BH CV ECHO MEAS - TR MAX PG: 24 MMHG
BH CV ECHO MEAS - TR MAX VEL: 246 CM/SEC
BH CV VAS BP LEFT ARM: NORMAL MMHG
BH CV XLRA - RV BASE: 5.9 CM
BH CV XLRA - RV LENGTH: 8.6 CM
BH CV XLRA - RV MID: 3.9 CM
BH CV XLRA - TDI S': 5.6 CM/SEC
BILIRUB CONJ SERPL-MCNC: 0.4 MG/DL (ref 0.2–0.3)
BILIRUB INDIRECT SERPL-MCNC: 0.5 MG/DL
BILIRUB SERPL-MCNC: 0.9 MG/DL (ref 0.2–1.2)
BILIRUB SERPL-MCNC: 1 MG/DL (ref 0.2–1.2)
BILIRUB SERPL-MCNC: 1 MG/DL (ref 0.2–1.2)
BILIRUB SERPL-MCNC: 1.1 MG/DL (ref 0.2–1.2)
BILIRUB SERPL-MCNC: 1.2 MG/DL (ref 0.2–1.2)
BILIRUB SERPL-MCNC: 1.7 MG/DL (ref 0.2–1.2)
BILIRUB SERPL-MCNC: 1.9 MG/DL (ref 0.2–1.2)
BILIRUB SERPL-MCNC: 2.5 MG/DL (ref 0.2–1.2)
BILIRUB UR QL STRIP: NEGATIVE
BODY TEMPERATURE: 37 C
BODY TEMPERATURE: 37 C
BUN BLD-MCNC: 109 MG/DL (ref 8–23)
BUN BLD-MCNC: 109 MG/DL (ref 8–23)
BUN BLD-MCNC: 67 MG/DL (ref 8–23)
BUN BLD-MCNC: 67 MG/DL (ref 8–23)
BUN BLD-MCNC: 77 MG/DL (ref 8–23)
BUN BLD-MCNC: 78 MG/DL (ref 8–23)
BUN BLD-MCNC: 86 MG/DL (ref 8–23)
BUN BLD-MCNC: 88 MG/DL (ref 8–23)
BUN BLD-MCNC: 98 MG/DL (ref 8–23)
BUN/CREAT SERPL: 27.5 (ref 7–25)
BUN/CREAT SERPL: 36.5 (ref 7–25)
BUN/CREAT SERPL: 36.7 (ref 7–25)
BUN/CREAT SERPL: 37.2 (ref 7–25)
BUN/CREAT SERPL: 40 (ref 7–25)
BUN/CREAT SERPL: 41 (ref 7–25)
BUN/CREAT SERPL: 41.1 (ref 7–25)
BUN/CREAT SERPL: 41.7 (ref 7–25)
BUN/CREAT SERPL: 44.7 (ref 7–25)
BURR CELLS BLD QL SMEAR: ABNORMAL
BURR CELLS BLD QL SMEAR: ABNORMAL
CALCIUM SPEC-SCNC: 8.6 MG/DL (ref 8.6–10.5)
CALCIUM SPEC-SCNC: 8.8 MG/DL (ref 8.6–10.5)
CALCIUM SPEC-SCNC: 8.9 MG/DL (ref 8.6–10.5)
CALCIUM SPEC-SCNC: 9.1 MG/DL (ref 8.6–10.5)
CALCIUM SPEC-SCNC: 9.2 MG/DL (ref 8.6–10.5)
CALCIUM SPEC-SCNC: 9.4 MG/DL (ref 8.6–10.5)
CALCIUM SPEC-SCNC: 9.5 MG/DL (ref 8.6–10.5)
CHLORIDE SERPL-SCNC: 89 MMOL/L (ref 98–107)
CHLORIDE SERPL-SCNC: 90 MMOL/L (ref 98–107)
CHLORIDE SERPL-SCNC: 91 MMOL/L (ref 98–107)
CHLORIDE SERPL-SCNC: 92 MMOL/L (ref 98–107)
CHLORIDE SERPL-SCNC: 93 MMOL/L (ref 98–107)
CHLORIDE SERPL-SCNC: 93 MMOL/L (ref 98–107)
CHLORIDE SERPL-SCNC: 94 MMOL/L (ref 98–107)
CHLORIDE SERPL-SCNC: 94 MMOL/L (ref 98–107)
CHLORIDE SERPL-SCNC: 95 MMOL/L (ref 98–107)
CHOLEST SERPL-MCNC: 109 MG/DL (ref 0–200)
CK SERPL-CCNC: 1038 U/L (ref 20–200)
CK SERPL-CCNC: 1070 U/L (ref 20–200)
CK SERPL-CCNC: 609 U/L (ref 20–200)
CLARITY UR: ABNORMAL
CMV IGG SERPL IA-ACNC: 8.7 U/ML (ref 0–0.59)
CMV IGM SERPL IA-ACNC: <30 AU/ML (ref 0–29.9)
CO2 BLDA-SCNC: 11.7 MMOL/L (ref 22–33)
CO2 BLDA-SCNC: 24 MMOL/L (ref 22–33)
CO2 SERPL-SCNC: 12 MMOL/L (ref 22–29)
CO2 SERPL-SCNC: 15 MMOL/L (ref 22–29)
CO2 SERPL-SCNC: 18 MMOL/L (ref 22–29)
CO2 SERPL-SCNC: 20 MMOL/L (ref 22–29)
CO2 SERPL-SCNC: 21 MMOL/L (ref 22–29)
CO2 SERPL-SCNC: 22 MMOL/L (ref 22–29)
CO2 SERPL-SCNC: 24 MMOL/L (ref 22–29)
COHGB MFR BLD: 1 % (ref 0–2)
COHGB MFR BLD: 1.2 % (ref 0–2)
COLOR UR: YELLOW
CORTIS SERPL-MCNC: 22.49 MCG/DL
CREAT BLD-MCNC: 1.63 MG/DL (ref 0.76–1.27)
CREAT BLD-MCNC: 1.8 MG/DL (ref 0.76–1.27)
CREAT BLD-MCNC: 1.87 MG/DL (ref 0.76–1.27)
CREAT BLD-MCNC: 1.88 MG/DL (ref 0.76–1.27)
CREAT BLD-MCNC: 1.97 MG/DL (ref 0.76–1.27)
CREAT BLD-MCNC: 2.15 MG/DL (ref 0.76–1.27)
CREAT BLD-MCNC: 2.67 MG/DL (ref 0.76–1.27)
CREAT BLD-MCNC: 2.99 MG/DL (ref 0.76–1.27)
CREAT BLD-MCNC: 3.97 MG/DL (ref 0.76–1.27)
D-LACTATE SERPL-SCNC: 1.8 MMOL/L (ref 0.5–2)
D-LACTATE SERPL-SCNC: 2.4 MMOL/L (ref 0.5–2)
DEPRECATED RDW RBC AUTO: 53.2 FL (ref 37–54)
DEPRECATED RDW RBC AUTO: 53.3 FL (ref 37–54)
DEPRECATED RDW RBC AUTO: 54 FL (ref 37–54)
DEPRECATED RDW RBC AUTO: 54.4 FL (ref 37–54)
DEPRECATED RDW RBC AUTO: 55 FL (ref 37–54)
DEPRECATED RDW RBC AUTO: 57.1 FL (ref 37–54)
DEPRECATED RDW RBC AUTO: 57.6 FL (ref 37–54)
DEPRECATED RDW RBC AUTO: 57.7 FL (ref 37–54)
DEPRECATED RDW RBC AUTO: 60 FL (ref 37–54)
EBV VCA IGM SER-ACNC: <36 U/ML (ref 0–35.9)
EOSINOPHIL # BLD AUTO: 0.01 10*3/MM3 (ref 0–0.4)
EOSINOPHIL # BLD AUTO: 0.01 10*3/MM3 (ref 0–0.4)
EOSINOPHIL # BLD AUTO: 0.06 10*3/MM3 (ref 0–0.4)
EOSINOPHIL # BLD MANUAL: 0 10*3/MM3 (ref 0–0.4)
EOSINOPHIL # BLD MANUAL: 0 10*3/MM3 (ref 0–0.4)
EOSINOPHIL NFR BLD AUTO: 0.1 % (ref 0.3–6.2)
EOSINOPHIL NFR BLD AUTO: 0.1 % (ref 0.3–6.2)
EOSINOPHIL NFR BLD AUTO: 0.6 % (ref 0.3–6.2)
EOSINOPHIL NFR BLD MANUAL: 0 % (ref 0.3–6.2)
EOSINOPHIL NFR BLD MANUAL: 0 % (ref 0.3–6.2)
EPAP: 0
EPAP: 6
ERYTHROCYTE [DISTWIDTH] IN BLOOD BY AUTOMATED COUNT: 16.3 % (ref 12.3–15.4)
ERYTHROCYTE [DISTWIDTH] IN BLOOD BY AUTOMATED COUNT: 16.7 % (ref 12.3–15.4)
ERYTHROCYTE [DISTWIDTH] IN BLOOD BY AUTOMATED COUNT: 16.8 % (ref 12.3–15.4)
ERYTHROCYTE [DISTWIDTH] IN BLOOD BY AUTOMATED COUNT: 17.1 % (ref 12.3–15.4)
ERYTHROCYTE [DISTWIDTH] IN BLOOD BY AUTOMATED COUNT: 17.4 % (ref 12.3–15.4)
ERYTHROCYTE [DISTWIDTH] IN BLOOD BY AUTOMATED COUNT: 17.6 % (ref 12.3–15.4)
ERYTHROCYTE [DISTWIDTH] IN BLOOD BY AUTOMATED COUNT: 17.8 % (ref 12.3–15.4)
GAMMA GLOB SERPL ELPH-MCNC: 1.1 G/DL (ref 0.4–1.8)
GFR SERPL CREATININE-BSD FRML MDRD: 14 ML/MIN/1.73
GFR SERPL CREATININE-BSD FRML MDRD: 20 ML/MIN/1.73
GFR SERPL CREATININE-BSD FRML MDRD: 23 ML/MIN/1.73
GFR SERPL CREATININE-BSD FRML MDRD: 29 ML/MIN/1.73
GFR SERPL CREATININE-BSD FRML MDRD: 32 ML/MIN/1.73
GFR SERPL CREATININE-BSD FRML MDRD: 34 ML/MIN/1.73
GFR SERPL CREATININE-BSD FRML MDRD: 34 ML/MIN/1.73
GFR SERPL CREATININE-BSD FRML MDRD: 36 ML/MIN/1.73
GFR SERPL CREATININE-BSD FRML MDRD: 40 ML/MIN/1.73
GFR SERPL CREATININE-BSD FRML MDRD: ABNORMAL ML/MIN/{1.73_M2}
GLOBULIN SER CALC-MCNC: 2.8 G/DL (ref 2.2–3.9)
GLOBULIN UR ELPH-MCNC: 2.6 GM/DL
GLOBULIN UR ELPH-MCNC: 2.8 GM/DL
GLOBULIN UR ELPH-MCNC: 3 GM/DL
GLOBULIN UR ELPH-MCNC: 3 GM/DL
GLOBULIN UR ELPH-MCNC: 3.2 GM/DL
GLOBULIN UR ELPH-MCNC: 3.2 GM/DL
GLOBULIN UR ELPH-MCNC: 3.3 GM/DL
GLUCOSE BLD-MCNC: 103 MG/DL (ref 65–99)
GLUCOSE BLD-MCNC: 107 MG/DL (ref 65–99)
GLUCOSE BLD-MCNC: 109 MG/DL (ref 65–99)
GLUCOSE BLD-MCNC: 112 MG/DL (ref 65–99)
GLUCOSE BLD-MCNC: 113 MG/DL (ref 65–99)
GLUCOSE BLD-MCNC: 127 MG/DL (ref 65–99)
GLUCOSE BLD-MCNC: 85 MG/DL (ref 65–99)
GLUCOSE BLD-MCNC: 96 MG/DL (ref 65–99)
GLUCOSE BLD-MCNC: 98 MG/DL (ref 65–99)
GLUCOSE BLDC GLUCOMTR-MCNC: 104 MG/DL (ref 70–130)
GLUCOSE BLDC GLUCOMTR-MCNC: 119 MG/DL (ref 70–130)
GLUCOSE BLDC GLUCOMTR-MCNC: 120 MG/DL (ref 70–130)
GLUCOSE BLDC GLUCOMTR-MCNC: 127 MG/DL (ref 70–130)
GLUCOSE BLDC GLUCOMTR-MCNC: 155 MG/DL (ref 70–130)
GLUCOSE BLDC GLUCOMTR-MCNC: 51 MG/DL (ref 70–130)
GLUCOSE BLDC GLUCOMTR-MCNC: 60 MG/DL (ref 70–130)
GLUCOSE BLDC GLUCOMTR-MCNC: 78 MG/DL (ref 70–130)
GLUCOSE UR STRIP-MCNC: NEGATIVE MG/DL
HAV IGM SERPL QL IA: NORMAL
HBA1C MFR BLD: 6.2 % (ref 4.8–5.6)
HBV CORE IGM SERPL QL IA: NORMAL
HBV SURFACE AG SERPL QL IA: NORMAL
HCO3 BLDA-SCNC: 11.1 MMOL/L (ref 20–26)
HCO3 BLDA-SCNC: 22.7 MMOL/L (ref 20–26)
HCT VFR BLD AUTO: 29.1 % (ref 37.5–51)
HCT VFR BLD AUTO: 30.2 % (ref 37.5–51)
HCT VFR BLD AUTO: 30.9 % (ref 37.5–51)
HCT VFR BLD AUTO: 31.2 % (ref 37.5–51)
HCT VFR BLD AUTO: 31.2 % (ref 37.5–51)
HCT VFR BLD AUTO: 31.3 % (ref 37.5–51)
HCT VFR BLD AUTO: 31.9 % (ref 37.5–51)
HCT VFR BLD AUTO: 32.2 % (ref 37.5–51)
HCT VFR BLD AUTO: 33 % (ref 37.5–51)
HCT VFR BLD CALC: 31.8 %
HCT VFR BLD CALC: 31.8 %
HCV AB SER DONR QL: NORMAL
HDLC SERPL-MCNC: 36 MG/DL (ref 40–60)
HGB BLD-MCNC: 10 G/DL (ref 13–17.7)
HGB BLD-MCNC: 10.1 G/DL (ref 13–17.7)
HGB BLD-MCNC: 10.2 G/DL (ref 13–17.7)
HGB BLD-MCNC: 10.2 G/DL (ref 13–17.7)
HGB BLD-MCNC: 9.4 G/DL (ref 13–17.7)
HGB BLD-MCNC: 9.4 G/DL (ref 13–17.7)
HGB BLD-MCNC: 9.6 G/DL (ref 13–17.7)
HGB BLDA-MCNC: 10.4 G/DL (ref 13.5–17.5)
HGB BLDA-MCNC: 10.4 G/DL (ref 13.5–17.5)
HGB UR QL STRIP.AUTO: NEGATIVE
HOLD SPECIMEN: NORMAL
HOROWITZ INDEX BLD+IHG-RTO: 100 %
HOROWITZ INDEX BLD+IHG-RTO: 32 %
HYALINE CASTS UR QL AUTO: ABNORMAL /LPF
IMM GRANULOCYTES # BLD AUTO: 0.02 10*3/MM3 (ref 0–0.05)
IMM GRANULOCYTES # BLD AUTO: 0.04 10*3/MM3 (ref 0–0.05)
IMM GRANULOCYTES # BLD AUTO: 0.06 10*3/MM3 (ref 0–0.05)
IMM GRANULOCYTES NFR BLD AUTO: 0.2 % (ref 0–0.5)
IMM GRANULOCYTES NFR BLD AUTO: 0.4 % (ref 0–0.5)
IMM GRANULOCYTES NFR BLD AUTO: 0.6 % (ref 0–0.5)
INR PPP: 1.63 (ref 0.85–1.16)
INR PPP: 1.73 (ref 0.85–1.16)
INR PPP: 2.88 (ref 0.85–1.16)
IPAP: 0
IPAP: 16
IRON 24H UR-MRATE: 16 MCG/DL (ref 59–158)
IRON SATN MFR SERPL: 5 % (ref 20–50)
KETONES UR QL STRIP: NEGATIVE
LACTATE HOLD SPECIMEN: NORMAL
LDLC SERPL CALC-MCNC: 59 MG/DL (ref 0–100)
LDLC/HDLC SERPL: 1.65 {RATIO}
LEFT ATRIUM VOLUME INDEX: 54.9 ML/M^2
LEFT ATRIUM VOLUME: 118 ML
LEUKOCYTE ESTERASE UR QL STRIP.AUTO: ABNORMAL
LIPASE SERPL-CCNC: 40 U/L (ref 13–60)
LYMPHOCYTES # BLD AUTO: 1.19 10*3/MM3 (ref 0.7–3.1)
LYMPHOCYTES # BLD AUTO: 1.43 10*3/MM3 (ref 0.7–3.1)
LYMPHOCYTES # BLD AUTO: 2 10*3/MM3 (ref 0.7–3.1)
LYMPHOCYTES # BLD MANUAL: 1.24 10*3/MM3 (ref 0.7–3.1)
LYMPHOCYTES # BLD MANUAL: 1.95 10*3/MM3 (ref 0.7–3.1)
LYMPHOCYTES NFR BLD AUTO: 12.3 % (ref 19.6–45.3)
LYMPHOCYTES NFR BLD AUTO: 17.2 % (ref 19.6–45.3)
LYMPHOCYTES NFR BLD AUTO: 20.2 % (ref 19.6–45.3)
LYMPHOCYTES NFR BLD MANUAL: 11 % (ref 19.6–45.3)
LYMPHOCYTES NFR BLD MANUAL: 4 % (ref 5–12)
LYMPHOCYTES NFR BLD MANUAL: 9 % (ref 19.6–45.3)
LYMPHOCYTES NFR BLD MANUAL: 9 % (ref 5–12)
Lab: NORMAL
M-SPIKE: NORMAL G/DL
MAGNESIUM SERPL-MCNC: 2.4 MG/DL (ref 1.6–2.4)
MAGNESIUM SERPL-MCNC: 2.4 MG/DL (ref 1.6–2.4)
MAGNESIUM SERPL-MCNC: 2.5 MG/DL (ref 1.6–2.4)
MAXIMAL PREDICTED HEART RATE: 134 BPM
MCH RBC QN AUTO: 28.2 PG (ref 26.6–33)
MCH RBC QN AUTO: 28.6 PG (ref 26.6–33)
MCH RBC QN AUTO: 28.6 PG (ref 26.6–33)
MCH RBC QN AUTO: 28.7 PG (ref 26.6–33)
MCH RBC QN AUTO: 28.8 PG (ref 26.6–33)
MCH RBC QN AUTO: 28.8 PG (ref 26.6–33)
MCH RBC QN AUTO: 28.9 PG (ref 26.6–33)
MCH RBC QN AUTO: 29 PG (ref 26.6–33)
MCH RBC QN AUTO: 29.1 PG (ref 26.6–33)
MCHC RBC AUTO-ENTMCNC: 30.8 G/DL (ref 31.5–35.7)
MCHC RBC AUTO-ENTMCNC: 30.9 G/DL (ref 31.5–35.7)
MCHC RBC AUTO-ENTMCNC: 31.1 G/DL (ref 31.5–35.7)
MCHC RBC AUTO-ENTMCNC: 31.4 G/DL (ref 31.5–35.7)
MCHC RBC AUTO-ENTMCNC: 32 G/DL (ref 31.5–35.7)
MCHC RBC AUTO-ENTMCNC: 32.3 G/DL (ref 31.5–35.7)
MCHC RBC AUTO-ENTMCNC: 32.3 G/DL (ref 31.5–35.7)
MCHC RBC AUTO-ENTMCNC: 32.4 G/DL (ref 31.5–35.7)
MCHC RBC AUTO-ENTMCNC: 32.4 G/DL (ref 31.5–35.7)
MCV RBC AUTO: 87.4 FL (ref 79–97)
MCV RBC AUTO: 88.9 FL (ref 79–97)
MCV RBC AUTO: 89.4 FL (ref 79–97)
MCV RBC AUTO: 89.8 FL (ref 79–97)
MCV RBC AUTO: 89.9 FL (ref 79–97)
MCV RBC AUTO: 91.5 FL (ref 79–97)
MCV RBC AUTO: 91.8 FL (ref 79–97)
MCV RBC AUTO: 93.2 FL (ref 79–97)
MCV RBC AUTO: 94 FL (ref 79–97)
METHGB BLD QL: 0.6 % (ref 0–1.5)
METHGB BLD QL: 0.9 % (ref 0–1.5)
MODALITY: ABNORMAL
MODALITY: ABNORMAL
MONOCYTES # BLD AUTO: 0.71 10*3/MM3 (ref 0.1–0.9)
MONOCYTES # BLD AUTO: 0.76 10*3/MM3 (ref 0.1–0.9)
MONOCYTES # BLD AUTO: 0.82 10*3/MM3 (ref 0.1–0.9)
MONOCYTES # BLD AUTO: 1.04 10*3/MM3 (ref 0.1–0.9)
MONOCYTES # BLD AUTO: 1.24 10*3/MM3 (ref 0.1–0.9)
MONOCYTES NFR BLD AUTO: 10.5 % (ref 5–12)
MONOCYTES NFR BLD AUTO: 8.5 % (ref 5–12)
MONOCYTES NFR BLD AUTO: 9.1 % (ref 5–12)
MYOGLOBIN UR-MCNC: <2 NG/ML (ref 0–13)
NEUTROPHILS # BLD AUTO: 11.3 10*3/MM3 (ref 1.7–7)
NEUTROPHILS # BLD AUTO: 15.05 10*3/MM3 (ref 1.7–7)
NEUTROPHILS # BLD AUTO: 6.07 10*3/MM3 (ref 1.7–7)
NEUTROPHILS # BLD AUTO: 6.68 10*3/MM3 (ref 1.7–7)
NEUTROPHILS # BLD AUTO: 7.61 10*3/MM3 (ref 1.7–7)
NEUTROPHILS NFR BLD AUTO: 67.7 % (ref 42.7–76)
NEUTROPHILS NFR BLD AUTO: 73.2 % (ref 42.7–76)
NEUTROPHILS NFR BLD AUTO: 78.5 % (ref 42.7–76)
NEUTROPHILS NFR BLD MANUAL: 82 % (ref 42.7–76)
NEUTROPHILS NFR BLD MANUAL: 84 % (ref 42.7–76)
NEUTS BAND NFR BLD MANUAL: 1 % (ref 0–5)
NITRITE UR QL STRIP: NEGATIVE
NOTE: ABNORMAL
NOTE: ABNORMAL
NRBC BLD AUTO-RTO: 0.2 /100 WBC (ref 0–0.2)
NRBC BLD AUTO-RTO: 0.5 /100 WBC (ref 0–0.2)
NRBC BLD AUTO-RTO: 1.9 /100 WBC (ref 0–0.2)
NRBC SPEC MANUAL: 6 /100 WBC (ref 0–0.2)
NT-PROBNP SERPL-MCNC: ABNORMAL PG/ML (ref 5–1800)
OXYHGB MFR BLDV: 86 % (ref 94–99)
OXYHGB MFR BLDV: 98.8 % (ref 94–99)
PAW @ PEAK INSP FLOW SETTING VENT: 0 CMH2O
PCO2 BLDA: 19.3 MM HG (ref 35–45)
PCO2 BLDA: 42.4 MM HG (ref 35–45)
PCO2 TEMP ADJ BLD: 19.3 MM HG (ref 35–48)
PCO2 TEMP ADJ BLD: 42.4 MM HG (ref 35–48)
PH BLDA: 7.34 PH UNITS (ref 7.35–7.45)
PH BLDA: 7.37 PH UNITS (ref 7.35–7.45)
PH UR STRIP.AUTO: 6.5 [PH] (ref 5–8)
PH, TEMP CORRECTED: 7.34 PH UNITS
PH, TEMP CORRECTED: 7.37 PH UNITS
PHOSPHATE SERPL-MCNC: 4.9 MG/DL (ref 2.5–4.5)
PLAT MORPH BLD: NORMAL
PLAT MORPH BLD: NORMAL
PLATELET # BLD AUTO: 102 10*3/MM3 (ref 140–450)
PLATELET # BLD AUTO: 118 10*3/MM3 (ref 140–450)
PLATELET # BLD AUTO: 136 10*3/MM3 (ref 140–450)
PLATELET # BLD AUTO: 163 10*3/MM3 (ref 140–450)
PLATELET # BLD AUTO: 173 10*3/MM3 (ref 140–450)
PLATELET # BLD AUTO: 176 10*3/MM3 (ref 140–450)
PLATELET # BLD AUTO: 179 10*3/MM3 (ref 140–450)
PLATELET # BLD AUTO: 182 10*3/MM3 (ref 140–450)
PLATELET # BLD AUTO: 189 10*3/MM3 (ref 140–450)
PMV BLD AUTO: 11.5 FL (ref 6–12)
PMV BLD AUTO: 11.5 FL (ref 6–12)
PMV BLD AUTO: 11.6 FL (ref 6–12)
PMV BLD AUTO: 11.6 FL (ref 6–12)
PMV BLD AUTO: 11.9 FL (ref 6–12)
PMV BLD AUTO: 12 FL (ref 6–12)
PMV BLD AUTO: 12.1 FL (ref 6–12)
PMV BLD AUTO: 12.2 FL (ref 6–12)
PMV BLD AUTO: 12.4 FL (ref 6–12)
PO2 BLDA: 362 MM HG (ref 83–108)
PO2 BLDA: 61.9 MM HG (ref 83–108)
PO2 TEMP ADJ BLD: 362 MM HG (ref 83–108)
PO2 TEMP ADJ BLD: 61.9 MM HG (ref 83–108)
POTASSIUM BLD-SCNC: 4.4 MMOL/L (ref 3.5–5.2)
POTASSIUM BLD-SCNC: 4.5 MMOL/L (ref 3.5–5.2)
POTASSIUM BLD-SCNC: 4.8 MMOL/L (ref 3.5–5.2)
POTASSIUM BLD-SCNC: 5 MMOL/L (ref 3.5–5.2)
POTASSIUM BLD-SCNC: 5.5 MMOL/L (ref 3.5–5.2)
POTASSIUM BLD-SCNC: 5.8 MMOL/L (ref 3.5–5.2)
PROT PATTERN SERPL ELPH-IMP: NORMAL
PROT SERPL-MCNC: 6.4 G/DL (ref 6–8.5)
PROT SERPL-MCNC: 6.7 G/DL (ref 6–8.5)
PROT SERPL-MCNC: 6.7 G/DL (ref 6–8.5)
PROT SERPL-MCNC: 6.8 G/DL (ref 6–8.5)
PROT SERPL-MCNC: 6.8 G/DL (ref 6–8.5)
PROT SERPL-MCNC: 6.9 G/DL (ref 6–8.5)
PROT SERPL-MCNC: 6.9 G/DL (ref 6–8.5)
PROT SERPL-MCNC: 7 G/DL (ref 6–8.5)
PROT SERPL-MCNC: 7.1 G/DL (ref 6–8.5)
PROT UR QL STRIP: NEGATIVE
PROTHROMBIN TIME: 19.1 SECONDS (ref 11.5–14)
PROTHROMBIN TIME: 20 SECONDS (ref 11.5–14)
PROTHROMBIN TIME: 29.9 SECONDS (ref 11.5–14)
PSV: 10 CMH2O
RBC # BLD AUTO: 3.29 10*6/MM3 (ref 4.14–5.8)
RBC # BLD AUTO: 3.32 10*6/MM3 (ref 4.14–5.8)
RBC # BLD AUTO: 3.33 10*6/MM3 (ref 4.14–5.8)
RBC # BLD AUTO: 3.44 10*6/MM3 (ref 4.14–5.8)
RBC # BLD AUTO: 3.48 10*6/MM3 (ref 4.14–5.8)
RBC # BLD AUTO: 3.51 10*6/MM3 (ref 4.14–5.8)
RBC # BLD AUTO: 3.52 10*6/MM3 (ref 4.14–5.8)
RBC # BLD AUTO: 3.54 10*6/MM3 (ref 4.14–5.8)
RBC # BLD AUTO: 3.57 10*6/MM3 (ref 4.14–5.8)
RBC # UR: ABNORMAL /HPF
REF LAB TEST METHOD: ABNORMAL
SARS-COV-2 RNA RESP QL NAA+PROBE: NOT DETECTED
SARS-COV-2 RNA RESP QL NAA+PROBE: NOT DETECTED
SET MECH RESP RATE: 12
SODIUM BLD-SCNC: 129 MMOL/L (ref 136–145)
SODIUM BLD-SCNC: 129 MMOL/L (ref 136–145)
SODIUM BLD-SCNC: 130 MMOL/L (ref 136–145)
SODIUM BLD-SCNC: 131 MMOL/L (ref 136–145)
SODIUM BLD-SCNC: 131 MMOL/L (ref 136–145)
SODIUM BLD-SCNC: 132 MMOL/L (ref 136–145)
SODIUM BLD-SCNC: 133 MMOL/L (ref 136–145)
SP GR UR STRIP: 1.02 (ref 1–1.03)
SQUAMOUS #/AREA URNS HPF: ABNORMAL /HPF
STRESS TARGET HR: 114 BPM
TIBC SERPL-MCNC: 338 MCG/DL (ref 298–536)
TOTAL RATE: 0 BREATHS/MINUTE
TRANSFERRIN SERPL-MCNC: 227 MG/DL (ref 200–360)
TRIGL SERPL-MCNC: 68 MG/DL (ref 0–150)
TROPONIN T SERPL-MCNC: 0.04 NG/ML (ref 0–0.03)
TROPONIN T SERPL-MCNC: 0.04 NG/ML (ref 0–0.03)
TROPONIN T SERPL-MCNC: 0.06 NG/ML (ref 0–0.03)
TROPONIN T SERPL-MCNC: 0.07 NG/ML (ref 0–0.03)
TSH SERPL DL<=0.05 MIU/L-ACNC: 4.05 UIU/ML (ref 0.27–4.2)
UFH PPP CHRO-ACNC: 0.13 IU/ML (ref 0.3–0.7)
UFH PPP CHRO-ACNC: 0.88 IU/ML (ref 0.3–0.7)
URINE MYOGLOBIN, QUALITATIVE: ABNORMAL
UROBILINOGEN UR QL STRIP: ABNORMAL
VENTILATOR MODE: ABNORMAL
VIT B12 BLD-MCNC: >2000 PG/ML (ref 211–946)
VLDLC SERPL-MCNC: 13.6 MG/DL
WBC MORPH BLD: NORMAL
WBC MORPH BLD: NORMAL
WBC NRBC COR # BLD: 13.07 10*3/MM3 (ref 3.4–10.8)
WBC NRBC COR # BLD: 13.78 10*3/MM3 (ref 3.4–10.8)
WBC NRBC COR # BLD: 17.7 10*3/MM3 (ref 3.4–10.8)
WBC NRBC COR # BLD: 8.31 10*3/MM3 (ref 3.4–10.8)
WBC NRBC COR # BLD: 8.86 10*3/MM3 (ref 3.4–10.8)
WBC NRBC COR # BLD: 8.86 10*3/MM3 (ref 3.4–10.8)
WBC NRBC COR # BLD: 9.21 10*3/MM3 (ref 3.4–10.8)
WBC NRBC COR # BLD: 9.69 10*3/MM3 (ref 3.4–10.8)
WBC NRBC COR # BLD: 9.88 10*3/MM3 (ref 3.4–10.8)
WBC UR QL AUTO: ABNORMAL /HPF
WHOLE BLOOD HOLD SPECIMEN: NORMAL
WHOLE BLOOD HOLD SPECIMEN: NORMAL

## 2020-01-01 PROCEDURE — 83880 ASSAY OF NATRIURETIC PEPTIDE: CPT | Performed by: PHYSICIAN ASSISTANT

## 2020-01-01 PROCEDURE — 25010000002 CEFTRIAXONE PER 250 MG: Performed by: INTERNAL MEDICINE

## 2020-01-01 PROCEDURE — 80061 LIPID PANEL: CPT | Performed by: INTERNAL MEDICINE

## 2020-01-01 PROCEDURE — 87635 SARS-COV-2 COVID-19 AMP PRB: CPT | Performed by: INTERNAL MEDICINE

## 2020-01-01 PROCEDURE — 87086 URINE CULTURE/COLONY COUNT: CPT | Performed by: INTERNAL MEDICINE

## 2020-01-01 PROCEDURE — 25010000002 FUROSEMIDE PER 20 MG: Performed by: INTERNAL MEDICINE

## 2020-01-01 PROCEDURE — 82550 ASSAY OF CK (CPK): CPT | Performed by: INTERNAL MEDICINE

## 2020-01-01 PROCEDURE — 80076 HEPATIC FUNCTION PANEL: CPT | Performed by: INTERNAL MEDICINE

## 2020-01-01 PROCEDURE — 97530 THERAPEUTIC ACTIVITIES: CPT

## 2020-01-01 PROCEDURE — 25010000002 LORAZEPAM PER 2 MG: Performed by: FAMILY MEDICINE

## 2020-01-01 PROCEDURE — 93005 ELECTROCARDIOGRAM TRACING: CPT | Performed by: INTERNAL MEDICINE

## 2020-01-01 PROCEDURE — 25010000002 HEPARIN (PORCINE) PER 1000 UNITS: Performed by: INTERNAL MEDICINE

## 2020-01-01 PROCEDURE — 25010000002 ALBUMIN HUMAN 25% PER 50 ML: Performed by: INTERNAL MEDICINE

## 2020-01-01 PROCEDURE — 85007 BL SMEAR W/DIFF WBC COUNT: CPT

## 2020-01-01 PROCEDURE — 83874 ASSAY OF MYOGLOBIN: CPT | Performed by: INTERNAL MEDICINE

## 2020-01-01 PROCEDURE — 85610 PROTHROMBIN TIME: CPT | Performed by: INTERNAL MEDICINE

## 2020-01-01 PROCEDURE — 80053 COMPREHEN METABOLIC PANEL: CPT | Performed by: INTERNAL MEDICINE

## 2020-01-01 PROCEDURE — 80048 BASIC METABOLIC PNL TOTAL CA: CPT | Performed by: PHYSICIAN ASSISTANT

## 2020-01-01 PROCEDURE — 85025 COMPLETE CBC W/AUTO DIFF WBC: CPT

## 2020-01-01 PROCEDURE — 71250 CT THORAX DX C-: CPT

## 2020-01-01 PROCEDURE — 92523 SPEECH SOUND LANG COMPREHEN: CPT

## 2020-01-01 PROCEDURE — 99232 SBSQ HOSP IP/OBS MODERATE 35: CPT | Performed by: INTERNAL MEDICINE

## 2020-01-01 PROCEDURE — 0042T HC CT CEREBRAL PERFUSION W/WO CONTRAST: CPT

## 2020-01-01 PROCEDURE — 99233 SBSQ HOSP IP/OBS HIGH 50: CPT | Performed by: INTERNAL MEDICINE

## 2020-01-01 PROCEDURE — 93010 ELECTROCARDIOGRAM REPORT: CPT | Performed by: INTERNAL MEDICINE

## 2020-01-01 PROCEDURE — 84466 ASSAY OF TRANSFERRIN: CPT | Performed by: INTERNAL MEDICINE

## 2020-01-01 PROCEDURE — 92507 TX SP LANG VOICE COMM INDIV: CPT

## 2020-01-01 PROCEDURE — 85027 COMPLETE CBC AUTOMATED: CPT | Performed by: INTERNAL MEDICINE

## 2020-01-01 PROCEDURE — 82607 VITAMIN B-12: CPT | Performed by: INTERNAL MEDICINE

## 2020-01-01 PROCEDURE — 82962 GLUCOSE BLOOD TEST: CPT

## 2020-01-01 PROCEDURE — 70496 CT ANGIOGRAPHY HEAD: CPT

## 2020-01-01 PROCEDURE — 70450 CT HEAD/BRAIN W/O DYE: CPT

## 2020-01-01 PROCEDURE — 93306 TTE W/DOPPLER COMPLETE: CPT | Performed by: INTERNAL MEDICINE

## 2020-01-01 PROCEDURE — 71045 X-RAY EXAM CHEST 1 VIEW: CPT

## 2020-01-01 PROCEDURE — P9047 ALBUMIN (HUMAN), 25%, 50ML: HCPCS | Performed by: INTERNAL MEDICINE

## 2020-01-01 PROCEDURE — 76705 ECHO EXAM OF ABDOMEN: CPT

## 2020-01-01 PROCEDURE — 94799 UNLISTED PULMONARY SVC/PX: CPT

## 2020-01-01 PROCEDURE — 83690 ASSAY OF LIPASE: CPT | Performed by: EMERGENCY MEDICINE

## 2020-01-01 PROCEDURE — 94660 CPAP INITIATION&MGMT: CPT

## 2020-01-01 PROCEDURE — 86665 EPSTEIN-BARR CAPSID VCA: CPT | Performed by: PHYSICIAN ASSISTANT

## 2020-01-01 PROCEDURE — 81001 URINALYSIS AUTO W/SCOPE: CPT | Performed by: EMERGENCY MEDICINE

## 2020-01-01 PROCEDURE — 93005 ELECTROCARDIOGRAM TRACING: CPT | Performed by: PHYSICIAN ASSISTANT

## 2020-01-01 PROCEDURE — 85730 THROMBOPLASTIN TIME PARTIAL: CPT | Performed by: PHYSICIAN ASSISTANT

## 2020-01-01 PROCEDURE — 97110 THERAPEUTIC EXERCISES: CPT

## 2020-01-01 PROCEDURE — 71275 CT ANGIOGRAPHY CHEST: CPT

## 2020-01-01 PROCEDURE — 85610 PROTHROMBIN TIME: CPT | Performed by: PHYSICIAN ASSISTANT

## 2020-01-01 PROCEDURE — 85007 BL SMEAR W/DIFF WBC COUNT: CPT | Performed by: NURSE PRACTITIONER

## 2020-01-01 PROCEDURE — 25010000002 ZIPRASIDONE MESYLATE PER 10 MG: Performed by: PHYSICIAN ASSISTANT

## 2020-01-01 PROCEDURE — 84484 ASSAY OF TROPONIN QUANT: CPT | Performed by: PHYSICIAN ASSISTANT

## 2020-01-01 PROCEDURE — 99232 SBSQ HOSP IP/OBS MODERATE 35: CPT | Performed by: PHYSICIAN ASSISTANT

## 2020-01-01 PROCEDURE — 83735 ASSAY OF MAGNESIUM: CPT | Performed by: INTERNAL MEDICINE

## 2020-01-01 PROCEDURE — 80053 COMPREHEN METABOLIC PANEL: CPT | Performed by: EMERGENCY MEDICINE

## 2020-01-01 PROCEDURE — 93005 ELECTROCARDIOGRAM TRACING: CPT | Performed by: EMERGENCY MEDICINE

## 2020-01-01 PROCEDURE — 87147 CULTURE TYPE IMMUNOLOGIC: CPT | Performed by: INTERNAL MEDICINE

## 2020-01-01 PROCEDURE — 85025 COMPLETE CBC W/AUTO DIFF WBC: CPT | Performed by: PHYSICIAN ASSISTANT

## 2020-01-01 PROCEDURE — 93975 VASCULAR STUDY: CPT

## 2020-01-01 PROCEDURE — 83605 ASSAY OF LACTIC ACID: CPT | Performed by: EMERGENCY MEDICINE

## 2020-01-01 PROCEDURE — 0 IOPAMIDOL PER 1 ML: Performed by: INTERNAL MEDICINE

## 2020-01-01 PROCEDURE — 25010000002 NA FERRIC GLUC CPLX PER 12.5 MG: Performed by: INTERNAL MEDICINE

## 2020-01-01 PROCEDURE — 25010000002 FUROSEMIDE PER 20 MG: Performed by: PHYSICIAN ASSISTANT

## 2020-01-01 PROCEDURE — 36600 WITHDRAWAL OF ARTERIAL BLOOD: CPT

## 2020-01-01 PROCEDURE — 85025 COMPLETE CBC W/AUTO DIFF WBC: CPT | Performed by: NURSE PRACTITIONER

## 2020-01-01 PROCEDURE — 93306 TTE W/DOPPLER COMPLETE: CPT

## 2020-01-01 PROCEDURE — 93005 ELECTROCARDIOGRAM TRACING: CPT | Performed by: NURSE PRACTITIONER

## 2020-01-01 PROCEDURE — 83516 IMMUNOASSAY NONANTIBODY: CPT | Performed by: PHYSICIAN ASSISTANT

## 2020-01-01 PROCEDURE — 84484 ASSAY OF TROPONIN QUANT: CPT | Performed by: NURSE PRACTITIONER

## 2020-01-01 PROCEDURE — 80048 BASIC METABOLIC PNL TOTAL CA: CPT | Performed by: INTERNAL MEDICINE

## 2020-01-01 PROCEDURE — 87186 SC STD MICRODIL/AGAR DIL: CPT | Performed by: INTERNAL MEDICINE

## 2020-01-01 PROCEDURE — 82533 TOTAL CORTISOL: CPT | Performed by: INTERNAL MEDICINE

## 2020-01-01 PROCEDURE — 80074 ACUTE HEPATITIS PANEL: CPT | Performed by: INTERNAL MEDICINE

## 2020-01-01 PROCEDURE — 86644 CMV ANTIBODY: CPT | Performed by: PHYSICIAN ASSISTANT

## 2020-01-01 PROCEDURE — 84443 ASSAY THYROID STIM HORMONE: CPT | Performed by: INTERNAL MEDICINE

## 2020-01-01 PROCEDURE — 82140 ASSAY OF AMMONIA: CPT | Performed by: INTERNAL MEDICINE

## 2020-01-01 PROCEDURE — 94640 AIRWAY INHALATION TREATMENT: CPT

## 2020-01-01 PROCEDURE — 70498 CT ANGIOGRAPHY NECK: CPT

## 2020-01-01 PROCEDURE — 99222 1ST HOSP IP/OBS MODERATE 55: CPT | Performed by: INTERNAL MEDICINE

## 2020-01-01 PROCEDURE — 97116 GAIT TRAINING THERAPY: CPT

## 2020-01-01 PROCEDURE — 25010000002 ONDANSETRON PER 1 MG: Performed by: PHYSICIAN ASSISTANT

## 2020-01-01 PROCEDURE — 99223 1ST HOSP IP/OBS HIGH 75: CPT | Performed by: INTERNAL MEDICINE

## 2020-01-01 PROCEDURE — 85025 COMPLETE CBC W/AUTO DIFF WBC: CPT | Performed by: EMERGENCY MEDICINE

## 2020-01-01 PROCEDURE — 74176 CT ABD & PELVIS W/O CONTRAST: CPT

## 2020-01-01 PROCEDURE — 84484 ASSAY OF TROPONIN QUANT: CPT | Performed by: INTERNAL MEDICINE

## 2020-01-01 PROCEDURE — 25010000002 HYDROMORPHONE PER 4 MG: Performed by: EMERGENCY MEDICINE

## 2020-01-01 PROCEDURE — 86038 ANTINUCLEAR ANTIBODIES: CPT | Performed by: PHYSICIAN ASSISTANT

## 2020-01-01 PROCEDURE — 82805 BLOOD GASES W/O2 SATURATION: CPT

## 2020-01-01 PROCEDURE — 84100 ASSAY OF PHOSPHORUS: CPT | Performed by: PHYSICIAN ASSISTANT

## 2020-01-01 PROCEDURE — 70551 MRI BRAIN STEM W/O DYE: CPT

## 2020-01-01 PROCEDURE — 97166 OT EVAL MOD COMPLEX 45 MIN: CPT

## 2020-01-01 PROCEDURE — 83540 ASSAY OF IRON: CPT | Performed by: INTERNAL MEDICINE

## 2020-01-01 PROCEDURE — 25010000002 HALOPERIDOL LACTATE PER 5 MG: Performed by: PHYSICIAN ASSISTANT

## 2020-01-01 PROCEDURE — 99284 EMERGENCY DEPT VISIT MOD MDM: CPT

## 2020-01-01 PROCEDURE — 63710000001 ONDANSETRON PER 8 MG: Performed by: INTERNAL MEDICINE

## 2020-01-01 PROCEDURE — 25010000002 HEPARIN (PORCINE) PER 1000 UNITS

## 2020-01-01 PROCEDURE — 25010000002 LORAZEPAM PER 2 MG: Performed by: EMERGENCY MEDICINE

## 2020-01-01 PROCEDURE — 99222 1ST HOSP IP/OBS MODERATE 55: CPT | Performed by: PSYCHIATRY & NEUROLOGY

## 2020-01-01 PROCEDURE — 97162 PT EVAL MOD COMPLEX 30 MIN: CPT

## 2020-01-01 PROCEDURE — 84165 PROTEIN E-PHORESIS SERUM: CPT | Performed by: PHYSICIAN ASSISTANT

## 2020-01-01 PROCEDURE — 99221 1ST HOSP IP/OBS SF/LOW 40: CPT | Performed by: PHYSICIAN ASSISTANT

## 2020-01-01 PROCEDURE — 85520 HEPARIN ASSAY: CPT

## 2020-01-01 PROCEDURE — 86645 CMV ANTIBODY IGM: CPT | Performed by: PHYSICIAN ASSISTANT

## 2020-01-01 PROCEDURE — 83735 ASSAY OF MAGNESIUM: CPT | Performed by: PHYSICIAN ASSISTANT

## 2020-01-01 PROCEDURE — 83036 HEMOGLOBIN GLYCOSYLATED A1C: CPT | Performed by: NURSE PRACTITIONER

## 2020-01-01 PROCEDURE — 25010000002 MORPHINE PER 10 MG: Performed by: FAMILY MEDICINE

## 2020-01-01 RX ORDER — SODIUM CHLORIDE 0.9 % (FLUSH) 0.9 %
10 SYRINGE (ML) INJECTION AS NEEDED
Status: DISCONTINUED | OUTPATIENT
Start: 2020-01-01 | End: 2020-01-01 | Stop reason: HOSPADM

## 2020-01-01 RX ORDER — TEMAZEPAM 7.5 MG/1
7.5 CAPSULE ORAL NIGHTLY
Qty: 30 CAPSULE | Refills: 0 | OUTPATIENT
Start: 2020-01-01 | End: 2020-06-05

## 2020-01-01 RX ORDER — AZITHROMYCIN 250 MG/1
250 TABLET, FILM COATED ORAL DAILY
Status: DISCONTINUED | OUTPATIENT
Start: 2020-01-01 | End: 2020-01-01

## 2020-01-01 RX ORDER — METOPROLOL SUCCINATE 25 MG/1
25 TABLET, EXTENDED RELEASE ORAL
Status: DISCONTINUED | OUTPATIENT
Start: 2020-01-01 | End: 2020-01-01

## 2020-01-01 RX ORDER — IPRATROPIUM BROMIDE AND ALBUTEROL SULFATE 2.5; .5 MG/3ML; MG/3ML
3 SOLUTION RESPIRATORY (INHALATION) EVERY 4 HOURS PRN
Status: DISCONTINUED | OUTPATIENT
Start: 2020-01-01 | End: 2020-01-01 | Stop reason: HOSPADM

## 2020-01-01 RX ORDER — LORAZEPAM 1 MG/1
1 TABLET ORAL EVERY 8 HOURS PRN
Qty: 10 TABLET | Refills: 0 | OUTPATIENT
Start: 2020-01-01

## 2020-01-01 RX ORDER — SODIUM CHLORIDE 9 MG/ML
100 INJECTION, SOLUTION INTRAVENOUS CONTINUOUS
Status: ACTIVE | OUTPATIENT
Start: 2020-01-01 | End: 2020-01-01

## 2020-01-01 RX ORDER — OXYCODONE HYDROCHLORIDE AND ACETAMINOPHEN 5; 325 MG/1; MG/1
1 TABLET ORAL EVERY 6 HOURS PRN
Status: DISCONTINUED | OUTPATIENT
Start: 2020-01-01 | End: 2020-01-01 | Stop reason: HOSPADM

## 2020-01-01 RX ORDER — METOPROLOL TARTRATE 50 MG/1
50 TABLET, FILM COATED ORAL EVERY 12 HOURS SCHEDULED
Status: DISCONTINUED | OUTPATIENT
Start: 2020-01-01 | End: 2020-01-01

## 2020-01-01 RX ORDER — ROPINIROLE 0.5 MG/1
0.25 TABLET, FILM COATED ORAL NIGHTLY
Status: DISCONTINUED | OUTPATIENT
Start: 2020-01-01 | End: 2020-01-01

## 2020-01-01 RX ORDER — MIDODRINE HYDROCHLORIDE 5 MG/1
10 TABLET ORAL
Status: DISCONTINUED | OUTPATIENT
Start: 2020-01-01 | End: 2020-01-01 | Stop reason: HOSPADM

## 2020-01-01 RX ORDER — SODIUM CHLORIDE 0.9 % (FLUSH) 0.9 %
10 SYRINGE (ML) INJECTION AS NEEDED
Status: DISCONTINUED | OUTPATIENT
Start: 2020-01-01 | End: 2020-01-01

## 2020-01-01 RX ORDER — ONDANSETRON 2 MG/ML
4 INJECTION INTRAMUSCULAR; INTRAVENOUS ONCE
Status: COMPLETED | OUTPATIENT
Start: 2020-01-01 | End: 2020-01-01

## 2020-01-01 RX ORDER — HEPARIN SODIUM 5000 [USP'U]/ML
5000 INJECTION, SOLUTION INTRAVENOUS; SUBCUTANEOUS EVERY 8 HOURS SCHEDULED
Status: DISCONTINUED | OUTPATIENT
Start: 2020-01-01 | End: 2020-01-01

## 2020-01-01 RX ORDER — TEMAZEPAM 7.5 MG/1
7.5 CAPSULE ORAL NIGHTLY
Status: DISCONTINUED | OUTPATIENT
Start: 2020-01-01 | End: 2020-01-01 | Stop reason: HOSPADM

## 2020-01-01 RX ORDER — FUROSEMIDE 10 MG/ML
20 INJECTION INTRAMUSCULAR; INTRAVENOUS ONCE
Status: COMPLETED | OUTPATIENT
Start: 2020-01-01 | End: 2020-01-01

## 2020-01-01 RX ORDER — ROPINIROLE 0.5 MG/1
0.5 TABLET, FILM COATED ORAL NIGHTLY
Qty: 30 TABLET | Refills: 1 | OUTPATIENT
Start: 2020-01-01

## 2020-01-01 RX ORDER — ACETAMINOPHEN 325 MG/1
650 TABLET ORAL EVERY 4 HOURS PRN
Status: DISCONTINUED | OUTPATIENT
Start: 2020-01-01 | End: 2020-01-01 | Stop reason: HOSPADM

## 2020-01-01 RX ORDER — SODIUM CHLORIDE 0.9 % (FLUSH) 0.9 %
10 SYRINGE (ML) INJECTION EVERY 12 HOURS SCHEDULED
Status: DISCONTINUED | OUTPATIENT
Start: 2020-01-01 | End: 2020-01-01 | Stop reason: HOSPADM

## 2020-01-01 RX ORDER — ZIPRASIDONE MESYLATE 20 MG/ML
10 INJECTION, POWDER, LYOPHILIZED, FOR SOLUTION INTRAMUSCULAR ONCE
Status: COMPLETED | OUTPATIENT
Start: 2020-01-01 | End: 2020-01-01

## 2020-01-01 RX ORDER — GABAPENTIN 100 MG/1
200 CAPSULE ORAL ONCE
Status: COMPLETED | OUTPATIENT
Start: 2020-01-01 | End: 2020-01-01

## 2020-01-01 RX ORDER — OXYCODONE HYDROCHLORIDE AND ACETAMINOPHEN 5; 325 MG/1; MG/1
1 TABLET ORAL EVERY 6 HOURS PRN
Qty: 24 TABLET | Refills: 0 | OUTPATIENT
Start: 2020-01-01 | End: 2020-06-05

## 2020-01-01 RX ORDER — ALBUMIN (HUMAN) 12.5 G/50ML
25 SOLUTION INTRAVENOUS 3 TIMES DAILY
Status: DISCONTINUED | OUTPATIENT
Start: 2020-01-01 | End: 2020-01-01 | Stop reason: HOSPADM

## 2020-01-01 RX ORDER — LORAZEPAM 2 MG/ML
0.5 INJECTION INTRAMUSCULAR ONCE
Status: COMPLETED | OUTPATIENT
Start: 2020-01-01 | End: 2020-01-01

## 2020-01-01 RX ORDER — ACETAMINOPHEN 160 MG/5ML
650 SOLUTION ORAL EVERY 4 HOURS PRN
Status: DISCONTINUED | OUTPATIENT
Start: 2020-01-01 | End: 2020-01-01 | Stop reason: HOSPADM

## 2020-01-01 RX ORDER — PSEUDOEPHEDRINE HCL 30 MG
100 TABLET ORAL 2 TIMES DAILY PRN
Qty: 60 EACH | Refills: 1 | OUTPATIENT
Start: 2020-01-01

## 2020-01-01 RX ORDER — ACETAMINOPHEN 650 MG/1
650 SUPPOSITORY RECTAL EVERY 4 HOURS PRN
Status: DISCONTINUED | OUTPATIENT
Start: 2020-01-01 | End: 2020-01-01 | Stop reason: HOSPADM

## 2020-01-01 RX ORDER — LORAZEPAM 2 MG/ML
0.5 INJECTION INTRAMUSCULAR EVERY 6 HOURS PRN
Status: DISCONTINUED | OUTPATIENT
Start: 2020-01-01 | End: 2020-01-01 | Stop reason: HOSPADM

## 2020-01-01 RX ORDER — MORPHINE SULFATE 4 MG/ML
4 INJECTION, SOLUTION INTRAMUSCULAR; INTRAVENOUS
Status: DISCONTINUED | OUTPATIENT
Start: 2020-01-01 | End: 2020-01-01 | Stop reason: HOSPADM

## 2020-01-01 RX ORDER — METOLAZONE 2.5 MG/1
2.5 TABLET ORAL ONCE
Status: COMPLETED | OUTPATIENT
Start: 2020-01-01 | End: 2020-01-01

## 2020-01-01 RX ORDER — HALOPERIDOL 5 MG/ML
1 INJECTION INTRAMUSCULAR ONCE
Status: COMPLETED | OUTPATIENT
Start: 2020-01-01 | End: 2020-01-01

## 2020-01-01 RX ORDER — HEPARIN SODIUM 5000 [USP'U]/ML
5000 INJECTION, SOLUTION INTRAVENOUS; SUBCUTANEOUS EVERY 12 HOURS SCHEDULED
Status: DISCONTINUED | OUTPATIENT
Start: 2020-01-01 | End: 2020-01-01 | Stop reason: HOSPADM

## 2020-01-01 RX ORDER — LORAZEPAM 0.5 MG/1
0.5 TABLET ORAL EVERY 6 HOURS PRN
Status: DISCONTINUED | OUTPATIENT
Start: 2020-01-01 | End: 2020-01-01 | Stop reason: HOSPADM

## 2020-01-01 RX ORDER — CHOLECALCIFEROL (VITAMIN D3) 125 MCG
5 CAPSULE ORAL NIGHTLY PRN
Status: DISCONTINUED | OUTPATIENT
Start: 2020-01-01 | End: 2020-01-01 | Stop reason: HOSPADM

## 2020-01-01 RX ORDER — QUETIAPINE FUMARATE 25 MG/1
12.5 TABLET, FILM COATED ORAL ONCE
Status: COMPLETED | OUTPATIENT
Start: 2020-01-01 | End: 2020-01-01

## 2020-01-01 RX ORDER — HYDROXYZINE HYDROCHLORIDE 10 MG/1
10 TABLET, FILM COATED ORAL NIGHTLY PRN
Status: DISCONTINUED | OUTPATIENT
Start: 2020-01-01 | End: 2020-01-01

## 2020-01-01 RX ORDER — HYDROXYZINE HYDROCHLORIDE 25 MG/1
25 TABLET, FILM COATED ORAL NIGHTLY PRN
Status: DISCONTINUED | OUTPATIENT
Start: 2020-01-01 | End: 2020-01-01 | Stop reason: HOSPADM

## 2020-01-01 RX ORDER — FAMOTIDINE 20 MG/1
20 TABLET, FILM COATED ORAL 2 TIMES DAILY PRN
Status: DISCONTINUED | OUTPATIENT
Start: 2020-01-01 | End: 2020-01-01 | Stop reason: HOSPADM

## 2020-01-01 RX ORDER — ROPINIROLE 0.5 MG/1
0.5 TABLET, FILM COATED ORAL NIGHTLY
Status: DISCONTINUED | OUTPATIENT
Start: 2020-01-01 | End: 2020-01-01 | Stop reason: HOSPADM

## 2020-01-01 RX ORDER — GABAPENTIN 100 MG/1
100 CAPSULE ORAL ONCE
Status: COMPLETED | OUTPATIENT
Start: 2020-01-01 | End: 2020-01-01

## 2020-01-01 RX ORDER — TROLAMINE SALICYLATE 10 G/100G
CREAM TOPICAL 3 TIMES DAILY PRN
Status: DISCONTINUED | OUTPATIENT
Start: 2020-01-01 | End: 2020-01-01 | Stop reason: HOSPADM

## 2020-01-01 RX ORDER — ASPIRIN 81 MG/1
81 TABLET ORAL DAILY
Status: DISCONTINUED | OUTPATIENT
Start: 2020-01-01 | End: 2020-01-01 | Stop reason: HOSPADM

## 2020-01-01 RX ORDER — QUETIAPINE FUMARATE 25 MG/1
25 TABLET, FILM COATED ORAL NIGHTLY
Status: DISCONTINUED | OUTPATIENT
Start: 2020-01-01 | End: 2020-01-01

## 2020-01-01 RX ORDER — LORAZEPAM 2 MG/ML
0.25 INJECTION INTRAMUSCULAR ONCE
Status: COMPLETED | OUTPATIENT
Start: 2020-01-01 | End: 2020-01-01

## 2020-01-01 RX ORDER — GABAPENTIN 100 MG/1
100 CAPSULE ORAL NIGHTLY
Status: DISCONTINUED | OUTPATIENT
Start: 2020-01-01 | End: 2020-01-01

## 2020-01-01 RX ORDER — DEXTROSE MONOHYDRATE 25 G/50ML
25 INJECTION, SOLUTION INTRAVENOUS
Status: DISCONTINUED | OUTPATIENT
Start: 2020-01-01 | End: 2020-01-01 | Stop reason: HOSPADM

## 2020-01-01 RX ORDER — HYDROMORPHONE HYDROCHLORIDE 1 MG/ML
0.25 INJECTION, SOLUTION INTRAMUSCULAR; INTRAVENOUS; SUBCUTANEOUS ONCE
Status: COMPLETED | OUTPATIENT
Start: 2020-01-01 | End: 2020-01-01

## 2020-01-01 RX ORDER — PANTOPRAZOLE SODIUM 40 MG/1
40 TABLET, DELAYED RELEASE ORAL
Status: DISCONTINUED | OUTPATIENT
Start: 2020-01-01 | End: 2020-01-01 | Stop reason: HOSPADM

## 2020-01-01 RX ORDER — DOXYCYCLINE 100 MG/1
100 CAPSULE ORAL EVERY 12 HOURS SCHEDULED
Status: DISCONTINUED | OUTPATIENT
Start: 2020-01-01 | End: 2020-01-01 | Stop reason: HOSPADM

## 2020-01-01 RX ORDER — HEPARIN SODIUM 10000 [USP'U]/100ML
14 INJECTION, SOLUTION INTRAVENOUS
Status: DISCONTINUED | OUTPATIENT
Start: 2020-01-01 | End: 2020-01-01

## 2020-01-01 RX ORDER — DOCUSATE SODIUM 100 MG/1
100 CAPSULE, LIQUID FILLED ORAL 2 TIMES DAILY PRN
Status: DISCONTINUED | OUTPATIENT
Start: 2020-01-01 | End: 2020-01-01 | Stop reason: HOSPADM

## 2020-01-01 RX ORDER — ONDANSETRON 2 MG/ML
4 INJECTION INTRAMUSCULAR; INTRAVENOUS EVERY 6 HOURS PRN
Status: DISCONTINUED | OUTPATIENT
Start: 2020-01-01 | End: 2020-01-01 | Stop reason: HOSPADM

## 2020-01-01 RX ORDER — TAMSULOSIN HYDROCHLORIDE 0.4 MG/1
0.4 CAPSULE ORAL DAILY
Status: DISCONTINUED | OUTPATIENT
Start: 2020-01-01 | End: 2020-01-01

## 2020-01-01 RX ORDER — AZITHROMYCIN 250 MG/1
500 TABLET, FILM COATED ORAL DAILY
Status: COMPLETED | OUTPATIENT
Start: 2020-01-01 | End: 2020-01-01

## 2020-01-01 RX ORDER — MIDODRINE HYDROCHLORIDE 10 MG/1
10 TABLET ORAL
Qty: 90 TABLET | Refills: 1 | OUTPATIENT
Start: 2020-01-01

## 2020-01-01 RX ORDER — ONDANSETRON 4 MG/1
4 TABLET, FILM COATED ORAL EVERY 6 HOURS PRN
Status: DISCONTINUED | OUTPATIENT
Start: 2020-01-01 | End: 2020-01-01 | Stop reason: HOSPADM

## 2020-01-01 RX ORDER — TEMAZEPAM 15 MG/1
15 CAPSULE ORAL NIGHTLY
Status: DISCONTINUED | OUTPATIENT
Start: 2020-01-01 | End: 2020-01-01

## 2020-01-01 RX ORDER — ATORVASTATIN CALCIUM 40 MG/1
40 TABLET, FILM COATED ORAL NIGHTLY
Status: DISCONTINUED | OUTPATIENT
Start: 2020-01-01 | End: 2020-01-01

## 2020-01-01 RX ORDER — BUMETANIDE 2 MG/1
2 TABLET ORAL DAILY
COMMUNITY

## 2020-01-01 RX ORDER — HEPARIN SODIUM 1000 [USP'U]/ML
4000 INJECTION, SOLUTION INTRAVENOUS; SUBCUTANEOUS ONCE
Status: COMPLETED | OUTPATIENT
Start: 2020-01-01 | End: 2020-01-01

## 2020-01-01 RX ORDER — POTASSIUM CHLORIDE 750 MG/1
10 TABLET, FILM COATED, EXTENDED RELEASE ORAL 2 TIMES DAILY
COMMUNITY

## 2020-01-01 RX ORDER — MIDODRINE HYDROCHLORIDE 5 MG/1
5 TABLET ORAL
Status: DISCONTINUED | OUTPATIENT
Start: 2020-01-01 | End: 2020-01-01

## 2020-01-01 RX ORDER — NICOTINE POLACRILEX 4 MG
15 LOZENGE BUCCAL
Status: DISCONTINUED | OUTPATIENT
Start: 2020-01-01 | End: 2020-01-01 | Stop reason: HOSPADM

## 2020-01-01 RX ADMIN — MIDODRINE HYDROCHLORIDE 5 MG: 5 TABLET ORAL at 10:55

## 2020-01-01 RX ADMIN — IPRATROPIUM BROMIDE AND ALBUTEROL SULFATE 3 ML: .5; 3 SOLUTION RESPIRATORY (INHALATION) at 23:52

## 2020-01-01 RX ADMIN — DEXTROSE 15 G: 15 GEL ORAL at 16:31

## 2020-01-01 RX ADMIN — CEFTRIAXONE 1 G: 1 INJECTION, POWDER, FOR SOLUTION INTRAMUSCULAR; INTRAVENOUS at 09:22

## 2020-01-01 RX ADMIN — ACETAMINOPHEN 650 MG: 325 TABLET, FILM COATED ORAL at 01:19

## 2020-01-01 RX ADMIN — PANTOPRAZOLE SODIUM 40 MG: 40 TABLET, DELAYED RELEASE ORAL at 08:35

## 2020-01-01 RX ADMIN — DOXYCYCLINE 100 MG: 100 CAPSULE ORAL at 08:19

## 2020-01-01 RX ADMIN — DICLOFENAC SODIUM 4 G: 10 GEL TOPICAL at 11:59

## 2020-01-01 RX ADMIN — METOPROLOL TARTRATE 50 MG: 50 TABLET, FILM COATED ORAL at 21:00

## 2020-01-01 RX ADMIN — DOXYCYCLINE 100 MG: 100 CAPSULE ORAL at 20:30

## 2020-01-01 RX ADMIN — QUETIAPINE FUMARATE 12.5 MG: 25 TABLET ORAL at 15:04

## 2020-01-01 RX ADMIN — METOPROLOL TARTRATE 50 MG: 50 TABLET, FILM COATED ORAL at 20:08

## 2020-01-01 RX ADMIN — DICLOFENAC SODIUM 4 G: 10 GEL TOPICAL at 20:58

## 2020-01-01 RX ADMIN — GABAPENTIN 100 MG: 100 CAPSULE ORAL at 20:09

## 2020-01-01 RX ADMIN — HEPARIN SODIUM 5000 UNITS: 5000 INJECTION, SOLUTION INTRAVENOUS; SUBCUTANEOUS at 20:31

## 2020-01-01 RX ADMIN — DICLOFENAC SODIUM 4 G: 10 GEL TOPICAL at 08:38

## 2020-01-01 RX ADMIN — PANTOPRAZOLE SODIUM 40 MG: 40 TABLET, DELAYED RELEASE ORAL at 17:12

## 2020-01-01 RX ADMIN — QUETIAPINE FUMARATE 25 MG: 25 TABLET ORAL at 20:57

## 2020-01-01 RX ADMIN — SODIUM BICARBONATE: 84 INJECTION, SOLUTION INTRAVENOUS at 12:33

## 2020-01-01 RX ADMIN — ONDANSETRON 4 MG: 2 INJECTION INTRAMUSCULAR; INTRAVENOUS at 09:24

## 2020-01-01 RX ADMIN — HEPARIN SODIUM 5000 UNITS: 5000 INJECTION, SOLUTION INTRAVENOUS; SUBCUTANEOUS at 20:08

## 2020-01-01 RX ADMIN — DOXYCYCLINE 100 MG: 100 CAPSULE ORAL at 21:17

## 2020-01-01 RX ADMIN — METOPROLOL SUCCINATE 25 MG: 25 TABLET, EXTENDED RELEASE ORAL at 16:19

## 2020-01-01 RX ADMIN — ROPINIROLE HYDROCHLORIDE 0.5 MG: 0.5 TABLET, FILM COATED ORAL at 21:17

## 2020-01-01 RX ADMIN — IOPAMIDOL 115 ML: 755 INJECTION, SOLUTION INTRAVENOUS at 02:00

## 2020-01-01 RX ADMIN — MIDODRINE HYDROCHLORIDE 5 MG: 5 TABLET ORAL at 09:22

## 2020-01-01 RX ADMIN — SODIUM CHLORIDE 500 ML: 9 INJECTION, SOLUTION INTRAVENOUS at 09:24

## 2020-01-01 RX ADMIN — PANTOPRAZOLE SODIUM 40 MG: 40 TABLET, DELAYED RELEASE ORAL at 17:20

## 2020-01-01 RX ADMIN — SODIUM CHLORIDE, PRESERVATIVE FREE 10 ML: 5 INJECTION INTRAVENOUS at 20:58

## 2020-01-01 RX ADMIN — HEPARIN SODIUM 5000 UNITS: 5000 INJECTION, SOLUTION INTRAVENOUS; SUBCUTANEOUS at 05:12

## 2020-01-01 RX ADMIN — ACETAMINOPHEN 650 MG: 325 TABLET, FILM COATED ORAL at 04:03

## 2020-01-01 RX ADMIN — METOPROLOL TARTRATE 50 MG: 50 TABLET, FILM COATED ORAL at 20:57

## 2020-01-01 RX ADMIN — METOPROLOL TARTRATE 50 MG: 50 TABLET, FILM COATED ORAL at 08:34

## 2020-01-01 RX ADMIN — HEPARIN SODIUM 5000 UNITS: 5000 INJECTION, SOLUTION INTRAVENOUS; SUBCUTANEOUS at 15:25

## 2020-01-01 RX ADMIN — MORPHINE SULFATE 4 MG: 4 INJECTION, SOLUTION INTRAMUSCULAR; INTRAVENOUS at 03:59

## 2020-01-01 RX ADMIN — HEPARIN SODIUM 5000 UNITS: 5000 INJECTION, SOLUTION INTRAVENOUS; SUBCUTANEOUS at 20:54

## 2020-01-01 RX ADMIN — PANTOPRAZOLE SODIUM 40 MG: 40 TABLET, DELAYED RELEASE ORAL at 06:03

## 2020-01-01 RX ADMIN — MELATONIN TAB 5 MG 5 MG: 5 TAB at 20:09

## 2020-01-01 RX ADMIN — MIDODRINE HYDROCHLORIDE 10 MG: 5 TABLET ORAL at 08:16

## 2020-01-01 RX ADMIN — ATORVASTATIN CALCIUM 40 MG: 40 TABLET, FILM COATED ORAL at 20:18

## 2020-01-01 RX ADMIN — ACETAMINOPHEN 650 MG: 325 TABLET, FILM COATED ORAL at 22:28

## 2020-01-01 RX ADMIN — IVABRADINE 5 MG: 5 TABLET, FILM COATED ORAL at 08:50

## 2020-01-01 RX ADMIN — ASPIRIN 81 MG: 81 TABLET, COATED ORAL at 09:22

## 2020-01-01 RX ADMIN — PANTOPRAZOLE SODIUM 40 MG: 40 TABLET, DELAYED RELEASE ORAL at 17:34

## 2020-01-01 RX ADMIN — DICLOFENAC SODIUM 4 G: 10 GEL TOPICAL at 17:25

## 2020-01-01 RX ADMIN — MIDODRINE HYDROCHLORIDE 5 MG: 5 TABLET ORAL at 08:19

## 2020-01-01 RX ADMIN — SODIUM CHLORIDE, PRESERVATIVE FREE 10 ML: 5 INJECTION INTRAVENOUS at 09:23

## 2020-01-01 RX ADMIN — MELATONIN TAB 5 MG 5 MG: 5 TAB at 22:18

## 2020-01-01 RX ADMIN — MIDODRINE HYDROCHLORIDE 5 MG: 5 TABLET ORAL at 17:30

## 2020-01-01 RX ADMIN — SODIUM CHLORIDE, PRESERVATIVE FREE 10 ML: 5 INJECTION INTRAVENOUS at 21:19

## 2020-01-01 RX ADMIN — OXYCODONE HYDROCHLORIDE AND ACETAMINOPHEN 1 TABLET: 5; 325 TABLET ORAL at 20:54

## 2020-01-01 RX ADMIN — PANTOPRAZOLE SODIUM 40 MG: 40 TABLET, DELAYED RELEASE ORAL at 17:30

## 2020-01-01 RX ADMIN — SODIUM CHLORIDE, PRESERVATIVE FREE 10 ML: 5 INJECTION INTRAVENOUS at 08:35

## 2020-01-01 RX ADMIN — HYDROMORPHONE HYDROCHLORIDE 0.25 MG: 1 INJECTION, SOLUTION INTRAMUSCULAR; INTRAVENOUS; SUBCUTANEOUS at 09:36

## 2020-01-01 RX ADMIN — HEPARIN SODIUM 5000 UNITS: 5000 INJECTION, SOLUTION INTRAVENOUS; SUBCUTANEOUS at 20:22

## 2020-01-01 RX ADMIN — HEPARIN SODIUM 5000 UNITS: 5000 INJECTION, SOLUTION INTRAVENOUS; SUBCUTANEOUS at 08:20

## 2020-01-01 RX ADMIN — TEMAZEPAM 15 MG: 15 CAPSULE ORAL at 21:18

## 2020-01-01 RX ADMIN — CEFTRIAXONE 1 G: 1 INJECTION, POWDER, FOR SOLUTION INTRAMUSCULAR; INTRAVENOUS at 09:33

## 2020-01-01 RX ADMIN — ROPINIROLE HYDROCHLORIDE 0.25 MG: 0.5 TABLET, FILM COATED ORAL at 20:57

## 2020-01-01 RX ADMIN — ACETAMINOPHEN 650 MG: 325 TABLET, FILM COATED ORAL at 04:42

## 2020-01-01 RX ADMIN — PANTOPRAZOLE SODIUM 40 MG: 40 TABLET, DELAYED RELEASE ORAL at 16:54

## 2020-01-01 RX ADMIN — TAMSULOSIN HYDROCHLORIDE 0.4 MG: 0.4 CAPSULE ORAL at 16:19

## 2020-01-01 RX ADMIN — LORAZEPAM 0.5 MG: 0.5 TABLET ORAL at 18:52

## 2020-01-01 RX ADMIN — MELATONIN TAB 5 MG 5 MG: 5 TAB at 20:57

## 2020-01-01 RX ADMIN — METOPROLOL TARTRATE 12.5 MG: 25 TABLET, FILM COATED ORAL at 20:54

## 2020-01-01 RX ADMIN — AZITHROMYCIN 500 MG: 250 TABLET, FILM COATED ORAL at 17:33

## 2020-01-01 RX ADMIN — TEMAZEPAM 7.5 MG: 7.5 CAPSULE ORAL at 20:54

## 2020-01-01 RX ADMIN — DOXYCYCLINE 100 MG: 100 CAPSULE ORAL at 08:43

## 2020-01-01 RX ADMIN — DOXYCYCLINE 100 MG: 100 CAPSULE ORAL at 08:17

## 2020-01-01 RX ADMIN — MIDODRINE HYDROCHLORIDE 10 MG: 5 TABLET ORAL at 11:33

## 2020-01-01 RX ADMIN — HEPARIN SODIUM 5000 UNITS: 5000 INJECTION, SOLUTION INTRAVENOUS; SUBCUTANEOUS at 22:17

## 2020-01-01 RX ADMIN — PANTOPRAZOLE SODIUM 40 MG: 40 TABLET, DELAYED RELEASE ORAL at 08:19

## 2020-01-01 RX ADMIN — CEFTRIAXONE 2 G: 2 INJECTION, POWDER, FOR SOLUTION INTRAMUSCULAR; INTRAVENOUS at 11:11

## 2020-01-01 RX ADMIN — Medication 10 ML: at 19:29

## 2020-01-01 RX ADMIN — HEPARIN SODIUM 5000 UNITS: 5000 INJECTION, SOLUTION INTRAVENOUS; SUBCUTANEOUS at 17:33

## 2020-01-01 RX ADMIN — IOPAMIDOL 75 ML: 755 INJECTION, SOLUTION INTRAVENOUS at 02:00

## 2020-01-01 RX ADMIN — PANTOPRAZOLE SODIUM 40 MG: 40 TABLET, DELAYED RELEASE ORAL at 08:43

## 2020-01-01 RX ADMIN — TEMAZEPAM 7.5 MG: 7.5 CAPSULE ORAL at 21:18

## 2020-01-01 RX ADMIN — ASPIRIN 81 MG: 81 TABLET, COATED ORAL at 08:19

## 2020-01-01 RX ADMIN — SODIUM CHLORIDE 100 ML/HR: 9 INJECTION, SOLUTION INTRAVENOUS at 17:31

## 2020-01-01 RX ADMIN — HALOPERIDOL LACTATE 1 MG: 5 INJECTION, SOLUTION INTRAMUSCULAR at 02:02

## 2020-01-01 RX ADMIN — PANTOPRAZOLE SODIUM 40 MG: 40 TABLET, DELAYED RELEASE ORAL at 08:37

## 2020-01-01 RX ADMIN — ALBUMIN HUMAN 25 G: 0.25 SOLUTION INTRAVENOUS at 08:18

## 2020-01-01 RX ADMIN — TAMSULOSIN HYDROCHLORIDE 0.4 MG: 0.4 CAPSULE ORAL at 08:22

## 2020-01-01 RX ADMIN — SODIUM CHLORIDE, PRESERVATIVE FREE 10 ML: 5 INJECTION INTRAVENOUS at 08:39

## 2020-01-01 RX ADMIN — HYDROXYZINE HYDROCHLORIDE 25 MG: 25 TABLET, FILM COATED ORAL at 20:09

## 2020-01-01 RX ADMIN — METOLAZONE 2.5 MG: 2.5 TABLET ORAL at 02:24

## 2020-01-01 RX ADMIN — TAMSULOSIN HYDROCHLORIDE 0.4 MG: 0.4 CAPSULE ORAL at 08:37

## 2020-01-01 RX ADMIN — METOPROLOL TARTRATE 50 MG: 50 TABLET, FILM COATED ORAL at 08:26

## 2020-01-01 RX ADMIN — SODIUM CHLORIDE, PRESERVATIVE FREE 10 ML: 5 INJECTION INTRAVENOUS at 08:18

## 2020-01-01 RX ADMIN — HYDROXYZINE HYDROCHLORIDE 25 MG: 25 TABLET, FILM COATED ORAL at 20:57

## 2020-01-01 RX ADMIN — MELATONIN TAB 5 MG 5 MG: 5 TAB at 20:18

## 2020-01-01 RX ADMIN — HEPARIN SODIUM 4000 UNITS: 1000 INJECTION, SOLUTION INTRAVENOUS; SUBCUTANEOUS at 08:04

## 2020-01-01 RX ADMIN — SODIUM CHLORIDE 100 ML/HR: 9 INJECTION, SOLUTION INTRAVENOUS at 10:35

## 2020-01-01 RX ADMIN — METOPROLOL SUCCINATE 25 MG: 25 TABLET, EXTENDED RELEASE ORAL at 08:38

## 2020-01-01 RX ADMIN — AZITHROMYCIN DIHYDRATE 250 MG: 250 TABLET, FILM COATED ORAL at 08:22

## 2020-01-01 RX ADMIN — HEPARIN SODIUM 5000 UNITS: 5000 INJECTION, SOLUTION INTRAVENOUS; SUBCUTANEOUS at 08:43

## 2020-01-01 RX ADMIN — LORAZEPAM 0.5 MG: 0.5 TABLET ORAL at 10:32

## 2020-01-01 RX ADMIN — MELATONIN TAB 5 MG 5 MG: 5 TAB at 22:28

## 2020-01-01 RX ADMIN — MIDODRINE HYDROCHLORIDE 5 MG: 5 TABLET ORAL at 08:43

## 2020-01-01 RX ADMIN — SODIUM CHLORIDE, PRESERVATIVE FREE 10 ML: 5 INJECTION INTRAVENOUS at 20:10

## 2020-01-01 RX ADMIN — DICLOFENAC SODIUM 4 G: 10 GEL TOPICAL at 21:17

## 2020-01-01 RX ADMIN — MIDODRINE HYDROCHLORIDE 5 MG: 5 TABLET ORAL at 11:49

## 2020-01-01 RX ADMIN — QUETIAPINE FUMARATE 25 MG: 25 TABLET ORAL at 20:09

## 2020-01-01 RX ADMIN — ACETAMINOPHEN 650 MG: 325 TABLET, FILM COATED ORAL at 09:52

## 2020-01-01 RX ADMIN — GABAPENTIN 200 MG: 100 CAPSULE ORAL at 17:30

## 2020-01-01 RX ADMIN — ATORVASTATIN CALCIUM 40 MG: 40 TABLET, FILM COATED ORAL at 21:00

## 2020-01-01 RX ADMIN — METOPROLOL TARTRATE 25 MG: 25 TABLET, FILM COATED ORAL at 10:32

## 2020-01-01 RX ADMIN — ASPIRIN 81 MG: 81 TABLET, COATED ORAL at 08:17

## 2020-01-01 RX ADMIN — DICLOFENAC SODIUM 4 G: 10 GEL TOPICAL at 08:27

## 2020-01-01 RX ADMIN — SODIUM CHLORIDE, PRESERVATIVE FREE 10 ML: 5 INJECTION INTRAVENOUS at 20:18

## 2020-01-01 RX ADMIN — DICLOFENAC SODIUM 4 G: 10 GEL TOPICAL at 08:46

## 2020-01-01 RX ADMIN — ASPIRIN 81 MG: 81 TABLET, COATED ORAL at 08:37

## 2020-01-01 RX ADMIN — DICLOFENAC SODIUM 4 G: 10 GEL TOPICAL at 12:18

## 2020-01-01 RX ADMIN — PANTOPRAZOLE SODIUM 40 MG: 40 TABLET, DELAYED RELEASE ORAL at 17:40

## 2020-01-01 RX ADMIN — HYDROXYZINE HYDROCHLORIDE 10 MG: 10 TABLET, FILM COATED ORAL at 01:22

## 2020-01-01 RX ADMIN — HEPARIN SODIUM 5000 UNITS: 5000 INJECTION, SOLUTION INTRAVENOUS; SUBCUTANEOUS at 21:17

## 2020-01-01 RX ADMIN — AZITHROMYCIN DIHYDRATE 250 MG: 250 TABLET, FILM COATED ORAL at 08:37

## 2020-01-01 RX ADMIN — ACETAMINOPHEN 650 MG: 325 TABLET, FILM COATED ORAL at 02:23

## 2020-01-01 RX ADMIN — LORAZEPAM 0.5 MG: 2 INJECTION INTRAMUSCULAR; INTRAVENOUS at 02:01

## 2020-01-01 RX ADMIN — ROPINIROLE HYDROCHLORIDE 0.5 MG: 0.5 TABLET, FILM COATED ORAL at 20:30

## 2020-01-01 RX ADMIN — DICLOFENAC SODIUM 4 G: 10 GEL TOPICAL at 20:10

## 2020-01-01 RX ADMIN — MIDODRINE HYDROCHLORIDE 10 MG: 5 TABLET ORAL at 16:47

## 2020-01-01 RX ADMIN — MIDODRINE HYDROCHLORIDE 5 MG: 5 TABLET ORAL at 12:18

## 2020-01-01 RX ADMIN — ACETAMINOPHEN 650 MG: 325 TABLET, FILM COATED ORAL at 15:02

## 2020-01-01 RX ADMIN — HEPARIN SODIUM 5000 UNITS: 5000 INJECTION, SOLUTION INTRAVENOUS; SUBCUTANEOUS at 08:33

## 2020-01-01 RX ADMIN — ALBUMIN HUMAN 25 G: 0.25 SOLUTION INTRAVENOUS at 20:27

## 2020-01-01 RX ADMIN — MELATONIN TAB 5 MG 5 MG: 5 TAB at 21:18

## 2020-01-01 RX ADMIN — PANTOPRAZOLE SODIUM 40 MG: 40 TABLET, DELAYED RELEASE ORAL at 17:27

## 2020-01-01 RX ADMIN — FUROSEMIDE 20 MG: 10 INJECTION, SOLUTION INTRAMUSCULAR; INTRAVENOUS at 02:24

## 2020-01-01 RX ADMIN — METOPROLOL TARTRATE 50 MG: 50 TABLET, FILM COATED ORAL at 08:19

## 2020-01-01 RX ADMIN — ASPIRIN 81 MG: 81 TABLET, COATED ORAL at 09:32

## 2020-01-01 RX ADMIN — SODIUM CHLORIDE 125 MG: 9 INJECTION, SOLUTION INTRAVENOUS at 08:20

## 2020-01-01 RX ADMIN — ACETAMINOPHEN 650 MG: 325 TABLET, FILM COATED ORAL at 08:33

## 2020-01-01 RX ADMIN — LORAZEPAM 0.25 MG: 2 INJECTION INTRAMUSCULAR; INTRAVENOUS at 09:24

## 2020-01-01 RX ADMIN — PANTOPRAZOLE SODIUM 40 MG: 40 TABLET, DELAYED RELEASE ORAL at 08:17

## 2020-01-01 RX ADMIN — ACETAMINOPHEN 650 MG: 325 TABLET, FILM COATED ORAL at 14:33

## 2020-01-01 RX ADMIN — HEPARIN SODIUM 5000 UNITS: 5000 INJECTION, SOLUTION INTRAVENOUS; SUBCUTANEOUS at 09:22

## 2020-01-01 RX ADMIN — ACETAMINOPHEN 650 MG: 325 TABLET, FILM COATED ORAL at 23:49

## 2020-01-01 RX ADMIN — DOXYCYCLINE 100 MG: 100 CAPSULE ORAL at 20:08

## 2020-01-01 RX ADMIN — HYDROXYZINE HYDROCHLORIDE 10 MG: 10 TABLET, FILM COATED ORAL at 22:18

## 2020-01-01 RX ADMIN — ASPIRIN 81 MG: 81 TABLET, COATED ORAL at 08:21

## 2020-01-01 RX ADMIN — SODIUM CHLORIDE 125 MG: 9 INJECTION, SOLUTION INTRAVENOUS at 08:43

## 2020-01-01 RX ADMIN — SODIUM CHLORIDE, PRESERVATIVE FREE 10 ML: 5 INJECTION INTRAVENOUS at 10:56

## 2020-01-01 RX ADMIN — SODIUM CHLORIDE, PRESERVATIVE FREE 10 ML: 5 INJECTION INTRAVENOUS at 21:17

## 2020-01-01 RX ADMIN — GABAPENTIN 100 MG: 100 CAPSULE ORAL at 04:43

## 2020-01-01 RX ADMIN — DICLOFENAC SODIUM 4 G: 10 GEL TOPICAL at 16:55

## 2020-01-01 RX ADMIN — DICLOFENAC SODIUM 4 G: 10 GEL TOPICAL at 20:25

## 2020-01-01 RX ADMIN — CEFTRIAXONE 1 G: 1 INJECTION, POWDER, FOR SOLUTION INTRAMUSCULAR; INTRAVENOUS at 09:50

## 2020-01-01 RX ADMIN — HEPARIN SODIUM 5000 UNITS: 5000 INJECTION, SOLUTION INTRAVENOUS; SUBCUTANEOUS at 08:17

## 2020-01-01 RX ADMIN — METOPROLOL TARTRATE 25 MG: 25 TABLET, FILM COATED ORAL at 20:38

## 2020-01-01 RX ADMIN — DOXYCYCLINE 100 MG: 100 CAPSULE ORAL at 20:18

## 2020-01-01 RX ADMIN — IVABRADINE 5 MG: 5 TABLET, FILM COATED ORAL at 17:34

## 2020-01-01 RX ADMIN — CEFTRIAXONE 2 G: 2 INJECTION, POWDER, FOR SOLUTION INTRAMUSCULAR; INTRAVENOUS at 12:07

## 2020-01-01 RX ADMIN — ONDANSETRON HYDROCHLORIDE 4 MG: 4 TABLET, FILM COATED ORAL at 08:37

## 2020-01-01 RX ADMIN — OXYCODONE HYDROCHLORIDE AND ACETAMINOPHEN 1 TABLET: 5; 325 TABLET ORAL at 14:50

## 2020-01-01 RX ADMIN — TROLAMINE SALICYLATE 2 APPLICATION: 100 CREAM TOPICAL at 05:31

## 2020-01-01 RX ADMIN — ATORVASTATIN CALCIUM 40 MG: 40 TABLET, FILM COATED ORAL at 20:08

## 2020-01-01 RX ADMIN — CEFTRIAXONE 2 G: 2 INJECTION, POWDER, FOR SOLUTION INTRAMUSCULAR; INTRAVENOUS at 12:51

## 2020-01-01 RX ADMIN — PANTOPRAZOLE SODIUM 40 MG: 40 TABLET, DELAYED RELEASE ORAL at 08:21

## 2020-01-01 RX ADMIN — METOPROLOL TARTRATE 50 MG: 50 TABLET, FILM COATED ORAL at 20:46

## 2020-01-01 RX ADMIN — HEPARIN SODIUM 10.7 UNITS/KG/HR: 10000 INJECTION, SOLUTION INTRAVENOUS at 02:22

## 2020-01-01 RX ADMIN — ACETAMINOPHEN 650 MG: 325 TABLET, FILM COATED ORAL at 17:50

## 2020-01-01 RX ADMIN — FUROSEMIDE 20 MG: 10 INJECTION, SOLUTION INTRAMUSCULAR; INTRAVENOUS at 11:03

## 2020-01-01 RX ADMIN — DICLOFENAC SODIUM 4 G: 10 GEL TOPICAL at 17:30

## 2020-01-01 RX ADMIN — DOCUSATE SODIUM 100 MG: 100 CAPSULE, LIQUID FILLED ORAL at 12:41

## 2020-01-01 RX ADMIN — METOPROLOL TARTRATE 50 MG: 50 TABLET, FILM COATED ORAL at 08:43

## 2020-01-01 RX ADMIN — ROPINIROLE 0.25 MG: 0.5 TABLET, FILM COATED ORAL at 17:24

## 2020-01-01 RX ADMIN — HEPARIN SODIUM 5000 UNITS: 5000 INJECTION, SOLUTION INTRAVENOUS; SUBCUTANEOUS at 20:57

## 2020-01-01 RX ADMIN — DOXYCYCLINE 100 MG: 100 CAPSULE ORAL at 20:54

## 2020-01-01 RX ADMIN — ACETAMINOPHEN 650 MG: 325 TABLET, FILM COATED ORAL at 20:38

## 2020-01-01 RX ADMIN — PANTOPRAZOLE SODIUM 40 MG: 40 TABLET, DELAYED RELEASE ORAL at 16:47

## 2020-01-01 RX ADMIN — ROPINIROLE HYDROCHLORIDE 0.5 MG: 0.5 TABLET, FILM COATED ORAL at 20:54

## 2020-01-01 RX ADMIN — SODIUM CHLORIDE 125 MG: 9 INJECTION, SOLUTION INTRAVENOUS at 10:56

## 2020-01-01 RX ADMIN — MIDODRINE HYDROCHLORIDE 5 MG: 5 TABLET ORAL at 17:27

## 2020-01-01 RX ADMIN — ASPIRIN 81 MG: 81 TABLET, COATED ORAL at 08:43

## 2020-01-01 RX ADMIN — ACETAMINOPHEN 650 MG: 325 TABLET, FILM COATED ORAL at 04:11

## 2020-01-01 RX ADMIN — SODIUM CHLORIDE, PRESERVATIVE FREE 10 ML: 5 INJECTION INTRAVENOUS at 20:31

## 2020-01-01 RX ADMIN — DICLOFENAC SODIUM 4 G: 10 GEL TOPICAL at 09:22

## 2020-01-01 RX ADMIN — ASPIRIN 81 MG: 81 TABLET, COATED ORAL at 16:18

## 2020-01-01 RX ADMIN — IVABRADINE 5 MG: 5 TABLET, FILM COATED ORAL at 17:20

## 2020-01-01 RX ADMIN — OXYCODONE HYDROCHLORIDE AND ACETAMINOPHEN 1 TABLET: 5; 325 TABLET ORAL at 09:50

## 2020-01-01 RX ADMIN — DICLOFENAC SODIUM 4 G: 10 GEL TOPICAL at 17:27

## 2020-01-01 RX ADMIN — ZIPRASIDONE MESYLATE 10 MG: 20 INJECTION, POWDER, LYOPHILIZED, FOR SOLUTION INTRAMUSCULAR at 21:52

## 2020-01-01 RX ADMIN — ACETAMINOPHEN 650 MG: 325 TABLET, FILM COATED ORAL at 14:13

## 2020-01-01 RX ADMIN — ACETAMINOPHEN 650 MG: 325 TABLET, FILM COATED ORAL at 16:54

## 2020-01-01 RX ADMIN — DOXYCYCLINE 100 MG: 100 CAPSULE ORAL at 08:33

## 2020-01-01 RX ADMIN — DOXYCYCLINE 100 MG: 100 CAPSULE ORAL at 09:22

## 2020-01-01 RX ADMIN — FUROSEMIDE 20 MG: 10 INJECTION, SOLUTION INTRAMUSCULAR; INTRAVENOUS at 08:49

## 2020-01-01 RX ADMIN — DOXYCYCLINE 100 MG: 100 CAPSULE ORAL at 20:57

## 2020-01-01 RX ADMIN — DEXTROSE MONOHYDRATE 25 G: 25 INJECTION, SOLUTION INTRAVENOUS at 04:50

## 2020-01-01 RX ADMIN — ALBUMIN HUMAN 25 G: 0.25 SOLUTION INTRAVENOUS at 16:48

## 2020-01-01 RX ADMIN — MIDODRINE HYDROCHLORIDE 10 MG: 5 TABLET ORAL at 17:12

## 2020-01-01 RX ADMIN — METOPROLOL TARTRATE 50 MG: 50 TABLET, FILM COATED ORAL at 09:22

## 2020-01-01 RX ADMIN — MIDODRINE HYDROCHLORIDE 5 MG: 5 TABLET ORAL at 08:34

## 2020-01-01 RX ADMIN — MIDODRINE HYDROCHLORIDE 5 MG: 5 TABLET ORAL at 17:38

## 2020-01-01 RX ADMIN — DICLOFENAC SODIUM 4 G: 10 GEL TOPICAL at 08:14

## 2020-05-21 PROBLEM — R74.01 TRANSAMINITIS: Status: ACTIVE | Noted: 2020-01-01

## 2020-05-21 PROBLEM — J20.8 ACUTE BRONCHITIS DUE TO OTHER SPECIFIED ORGANISMS: Status: ACTIVE | Noted: 2020-01-01

## 2020-05-21 PROBLEM — N30.00 ACUTE CYSTITIS WITHOUT HEMATURIA: Status: ACTIVE | Noted: 2020-01-01

## 2020-05-21 PROBLEM — I50.9 ACUTE ON CHRONIC CONGESTIVE HEART FAILURE (HCC): Status: ACTIVE | Noted: 2020-01-01

## 2020-05-21 PROBLEM — R18.8 ASCITES: Status: ACTIVE | Noted: 2020-01-01

## 2020-05-21 PROBLEM — N17.9 ACUTE KIDNEY INJURY (HCC): Status: ACTIVE | Noted: 2020-01-01

## 2020-05-21 PROBLEM — I24.8 DEMAND ISCHEMIA (HCC): Status: ACTIVE | Noted: 2020-01-01

## 2020-05-21 PROBLEM — I44.7 LEFT BUNDLE BRANCH BLOCK: Status: ACTIVE | Noted: 2020-01-01

## 2020-05-21 PROBLEM — E87.20 LACTIC ACIDOSIS: Status: ACTIVE | Noted: 2020-01-01

## 2020-05-23 PROBLEM — B95.8 STAPHYLOCOCCUS INFECTION: Status: ACTIVE | Noted: 2020-01-01

## 2020-06-07 NOTE — DISCHARGE SUMMARY
[facility] Medicine Services  DISCHARGE/Death SUMMARY    Patient Name: Mathew Ely  : 1933  MRN: [id number]    Date of Admission: 2020  8:26 AM  Date of Discharge:  2020  Primary Care Physician: Mathew Hernández MD    Consults     Date and Time Order Name Status Description    2020 0030 Inpatient Palliative Care MD Consult Completed     2020 0030 Inpatient Gastroenterology Consult Completed     2020 0214 Inpatient Neurology Consult Stroke Completed     2020 1356 Inpatient Cardiology Consult Completed           Hospital Course     Presenting Problem:   Acute on chronic congestive heart failure, unspecified heart failure type (CMS/HCC) [I50.9]    Active Hospital Problems    Diagnosis  POA   • **Acute on chronic congestive heart failure (CMS/HCC) [I50.9]  Yes   • Staphylococcus infection [B95.8]  Yes   • Acute urinary tract infection [N30.00]  Yes   • Left bundle branch block [I44.7]  Yes   • Lactic acidosis [E87.2]  Yes   • Acute kidney injury (CMS/HCC) [N17.9]  Yes   • Demand ischemia (CMS/HCC) [I24.8]  Yes   • Transaminitis [R74.0]  Yes   • Ascites, very small, likely CHF related [R18.8]  Yes   • Acute bronchitis of left lower lobe [J20.8]  Yes   • Chronic insomnia [F51.04]  Yes   • Essential hypertension [I10]  Yes   • Gastroesophageal reflux disease [K21.9]  Yes   • Hyperlipidemia, mixed [E78.2]  Yes   • Coronary artery disease [I25.10]  Yes      Resolved Hospital Problems   No resolved problems to display.          Hospital Course:  Mathew Ely is a 86 y.o. male who was living at home with past medical history of coronary artery disease, CABG, essential hypertension, hyperlipidemia.  He came to ED with dyspnea and nausea. His proBNP was profoundly elevated concerning for acute congestive heart failure.  He was admitted for CHF, bronchitis, urinary tract infection, and rule out COVID-19.  His wife noted that at home he had frequent  tachycardia.     During his stay, he received antbiotics for a UTI and suspected bronchitis.  He had periods of confusion and a 'Code Stroke' was called at one point; ;it was determined that he more likely had mild cognitive impairment worsened by low blood pressures and acute illness.      His main issue was cardiovascular.  He was diagnosed with Afib/RVR and started on metoprolol, which helped control his rate.  Cardiologist was consulted.  His shortness of breath initially improved with IV diuresis.     However, his breathing then worsened as days went by, and his blood pressure was mostly in the 90s.  Echo noted  EF of 21-25 with severe tricuspid valve regurgitation.  LFTs were elevated on admission and marian dramatically throughout his stay - eventually it became clear this was due to hemodynamic instability probably causing both congestion and shock liver.  Blood pressures remained marginal, worse with sitting up.  Fluids were attempted and metoprolol stopped but this did not help, and he did not respond to midodrine.     His creatinine was 1.8 on admission but marian in the setting of persistent borderline hypotension.  Renal and cardiology consults were obtained but were unable to slow his downward progress. His wife wished to take him home for his final days. All attempts were made to expedite this with the help of his local Hospice service.  However, they required a repeat Covid PCR test prior to accepting him.  While this was pending, he worsened, becoming restless and confused.  His wife was by his side.  He  at 0600 on 2020.          Day of Discharge     HPI/PE:   Not seen on this calendar day      Pertinent  and/or Most Recent Results           Brief Urine Lab Results  (Last result in the past 365 days)      Color   Clarity   Blood   Leuk Est   Nitrite   Protein   CREAT   Urine HCG        20 1107 Yellow Cloudy Negative Moderate (2+) Negative Negative               Microbiology Results  Abnormal     Procedure Component Value - Date/Time    COVID PRE-OP / PRE-PROCEDURE SCREENING ORDER (NO ISOLATION) - Swab, Nasopharynx [[id number]] Collected:  05/28/20 1431    Lab Status:  Final result Specimen:  Swab from Nasopharynx Updated:  05/29/20 0231    Narrative:       The following orders were created for panel order COVID PRE-OP / PRE-PROCEDURE SCREENING ORDER (NO ISOLATION) - Swab, Nasopharynx.  Procedure                               Abnormality         Status                     ---------                               -----------         ------                     COVID-19,[facility]...[[id number]]  Normal              Final result                 Please view results for these tests on the individual orders.    COVID-19,[facility], NP SWAB IN TRANSPORT MEDIA 8-12 HR TAT - Swab, Nasopharynx [[id number]]  (Normal) Collected:  05/28/20 1431    Lab Status:  Final result Specimen:  Swab from Nasopharynx Updated:  05/29/20 0231     COVID19 Not Detected    Urine Culture - Urine, Urine, Clean Catch [[id number]]  (Abnormal)  (Susceptibility) Collected:  05/21/20 1107    Lab Status:  Edited Result - FINAL Specimen:  Urine, Clean Catch Updated:  05/25/20 1016     Urine Culture >100,000 CFU/mL Staphylococcus epidermidis      <10,000 CFU/mL Proteus mirabilis    Narrative:       Work up requested per Dr. Lazaro Rashid.     Susceptibility      Staphylococcus epidermidis     VISHAL     Clindamycin Susceptible     Oxacillin Resistant     Tetracycline Susceptible     Trimethoprim + Sulfamethoxazole Resistant     Vancomycin Susceptible                Susceptibility      Proteus mirabilis     VISHAL     Ampicillin Resistant     Ampicillin + Sulbactam Susceptible     Cefazolin Susceptible     Cefepime Susceptible     Ceftazidime Susceptible     Ceftriaxone Susceptible     Gentamicin Susceptible     Levofloxacin Susceptible     Nitrofurantoin Resistant     Piperacillin + Tazobactam Susceptible     Tetracycline Resistant      Trimethoprim + Sulfamethoxazole Susceptible                Susceptibility Comments     Staphylococcus epidermidis    This isolate does not demonstrate inducible clindamycin resistance in vitro.    This isolate does not demonstrate inducible clindamycin resistance in vitro.    This isolate does not demonstrate inducible clindamycin resistance in vitro.               SARS-CoV-2 PCR ([place] IN-HOUSE PERFORMED), NP SWAB IN TRANSPORT MEDIA - Swab, Nasopharynx [[id number]]  (Normal) Collected:  05/21/20 1213    Lab Status:  Final result Specimen:  Swab from Nasopharynx Updated:  05/21/20 1543     COVID19 Not Detected          Imaging Results (All)     Procedure Component Value Units Date/Time    XR Chest 1 View [[id number]] Collected:  05/27/20 1331     Updated:  05/28/20 1543    Narrative:       EXAMINATION: XR CHEST 1 VW- 05/27/2020     INDICATION: SOA; I50.9-Heart failure, unspecified; R11.2-Nausea with  vomiting, unspecified; N17.9-Acute kidney failure, unspecified;  I44.7-Left bundle-branch block, unspecified; R41.841-Cognitive  communication deficit; I50.43-Acute on chronic combined systolic  (congestive) and diastolic (congestive) heart failure; R18.8-Other  ascites; J20.8-Acute bronchitis due to other specified organisms; M51.26     COMPARISON: Chest x-ray dated 05/22/2020     FINDINGS: Persistent cardiomegaly status post median sternotomy with  increased central pulmonary vascularity. No focal consolidation or  opacification otherwise. No pneumothorax or pleural effusion.       Impression:       No significant interval change in cardiomegaly and increased  central pulmonary vascularity from prior.     D:  05/27/2020  E:  05/27/2020     This report was finalized on 5/28/2020 3:40 PM by Dr. Lawrence Cook.       US Abdomen Limited [[id number]] Collected:  05/27/20 0823     Updated:  05/27/20 1716    Narrative:       EXAMINATION: US ABDOMEN LIMITED-     INDICATION: LFTs rising; I50.9-Heart failure, unspecified;  R11.2-Nausea  with vomiting, unspecified; N17.9-Acute kidney failure, unspecified;  I44.7-Left bundle-branch block, unspecified; R41.841-Cognitive  communication deficit; I50.43-Acute on chronic combined systolic  (congestive) and diastolic (congestive) heart failure; R18.8-Other  ascites; J20.8-Acute bronchitis due to other specified organisms.     TECHNIQUE: Ultrasound right upper quadrant abdomen.     COMPARISON: None.     FINDINGS: Visualized portions of the pancreas are within normal limits.     Liver demonstrates overall homogeneous appearance of the hepatic  parenchyma without focal liver lesion. Trace perihepatic ascites.     Gallbladder is without cholelithiasis, however, gallbladder wall  thickening is noted and may be partially due to adjacent inflammatory  process.     Common bile duct measures 3 cm in diameter at the level of the clyde  hepatis within normal limits.     Right kidney measures 10.5 cm in length containing multiple anechoic  areas of simple renal cortical cysts without hydronephrosis or obvious  calculi.       Impression:       Gallbladder wall thickening with somewhat distention of the  gallbladder of indeterminate significance without cholelithiasis,  however, may be partially due to adjacent inflammatory process as  although the hepatic parenchyma is without focal liver lesion there is  trace perihepatic ascites noted. No gross biliary dilatation.     D:  05/27/2020  E:  05/27/2020         This report was finalized on 5/27/2020 5:13 PM by Dr. Lawrence Cook.       MRI Brain Without Contrast [[id number]] Collected:  05/23/20 0942     Updated:  05/26/20 0949    Narrative:       EXAMINATION: MRI BRAIN WO CONTRAST - 05/23/2020     INDICATION: I50.9-Heart failure, unspecified; R11.2-Nausea with  vomiting, unspecified; N17.9-Acute kidney failure, unspecified;  I44.7-Left bundle-branch block, unspecified. Right facial droop, right  upper arm drift, increased lethargy. Evaluate for stroke.      TECHNIQUE: Routine multiplanar imaging is obtained of the brain without  the administration gadolinium contrast.     COMPARISON: NONE     FINDINGS: No evidence of restricted diffusion to suggest evidence of an  acute ischemic insult. Flow voids are preserved in the major  intracranial vessels. There is atrophy identified of the brain. There  are signal changes seen diffusely throughout the periventricular and  subcortical white matter suggesting mild chronic small vessel ischemic  change. Motion artifact degrades image quality. There is no evidence of  hemorrhage. No evidence of hydrocephalus. No abnormal extra-axial fluid  collections identified. No mass, mass effect, or midline shift.  Visualized paranasal sinuses reveal mucosal thickening involving the  paranasal sinuses. Globes and orbits are intact. Mastoid air cells are  patent. No cerebellar pontine angle mass is identified. Pituitary and  sella are unremarkable in appearance. Craniovertebral junction is  preserved.        Impression:       Extensive atrophy identified of the brain. Mild chronic  changes seen within the periventricular and subcortical white matter  with chronic paranasal sinusitis. No acute intracranial abnormality is  identified.      DICTATED:   05/23/2020  EDITED/ls :   05/24/2020      This report was finalized on 5/26/2020 9:46 AM by Dr. Jaqueline Alatorre MD.       CT Angiogram Neck [[id number]] Collected:  05/23/20 0135     Updated:  05/23/20 0138    Narrative:       CT ANGIOGRAM, HEAD AND NECK, 5/23/2020    HISTORY:  86-year-old male hospital inpatient with acute on chronic congestive heart failure. New onset neurologic deficit tonight with newly noted right side weakness and right facial droop. Stroke evaluation.    TECHNIQUE:  CT angiogram of the head and neck with contrast. 3-D postprocessing was performed and reviewed. Evaluation for a significant carotid arterial stenosis is based on NASCET criteria. Radiation dose reduction  techniques included automated exposure control.  Radiation audit for known CT and nuclear cardiology exams in the last 12 months: 2.    COMPARISON:  Noncontrast head CT tonight.    CTA NECK:  Bovine type variant aortic arch branching pattern with no proximal great vessel stenosis. Patent vertebral artery with left vertebral dominance. Atheromatous plaque at the carotid bifurcations, but 0% stenosis by NASCET criteria.    CTA HEAD:  Intracranially, there is symmetric distal vascular contrast distribution in the anterior, middle and posterior cerebral artery territories. No compelling evidence of intracranial arterial flow limiting stenosis. No intracranial aneurysm or vascular  malformation is identified. The dural venous sinuses appear normal. No intracranial mass or abnormal contrast enhancement.      Impression:       Negative CT angiogram of the head and neck. No intracranial or extracranial arterial flow limiting stenosis or aneurysm.    Signer Name: Garo Gaston MD   Signed: 5/23/2020 1:35 AM   Workstation Name: [email or web address]    [facility]    CT Angiogram Head [[id number]] Collected:  05/23/20 0135     Updated:  05/23/20 0137    Narrative:       CT ANGIOGRAM, HEAD AND NECK, 5/23/2020    HISTORY:  86-year-old male hospital inpatient with acute on chronic congestive heart failure. New onset neurologic deficit tonight with newly noted right side weakness and right facial droop. Stroke evaluation.    TECHNIQUE:  CT angiogram of the head and neck with contrast. 3-D postprocessing was performed and reviewed. Evaluation for a significant carotid arterial stenosis is based on NASCET criteria. Radiation dose reduction techniques included automated exposure control.  Radiation audit for known CT and nuclear cardiology exams in the last 12 months: 2.    COMPARISON:  Noncontrast head CT tonight.    CTA NECK:  Bovine type variant aortic arch branching pattern with no proximal great vessel  stenosis. Patent vertebral artery with left vertebral dominance. Atheromatous plaque at the carotid bifurcations, but 0% stenosis by NASCET criteria.    CTA HEAD:  Intracranially, there is symmetric distal vascular contrast distribution in the anterior, middle and posterior cerebral artery territories. No compelling evidence of intracranial arterial flow limiting stenosis. No intracranial aneurysm or vascular  malformation is identified. The dural venous sinuses appear normal. No intracranial mass or abnormal contrast enhancement.      Impression:       Negative CT angiogram of the head and neck. No intracranial or extracranial arterial flow limiting stenosis or aneurysm.    Signer Name: Garo Gaston MD   Signed: 5/23/2020 1:35 AM   [facility]    [facility]    CT Angiogram Chest With & Without Contrast [[id number]] Collected:  05/23/20 0128     Updated:  05/23/20 0130    Narrative:       CT CHEST WITH CONTRAST, PE PROTOCOL, 5/23/2020    HISTORY:  86-year-old male hospital inpatient with increasing shortness of breath. Acute on chronic congestive heart failure with echo showing severely decreased left ventricular systolic function. Assessment for pulmonary embolism is requested. COVID-19 testing  is negative. The patient also is also undergoing assessment for acute stroke tonight. New onset right-sided weakness and facial droop.    TECHNIQUE:  CT examination of the chest with IV contrast. CTA MIP multiplanar pulmonary artery images were reformatted with 3-D postprocessing. Radiation dose reduction techniques included automated exposure control. Radiation audit for CT and nuclear cardiology  exams in the last 12 months: 2.    COMPARISON:  *  CT chest, 5/21/2020.    FINDINGS:  There is somewhat poor contrast opacification of medium and small pulmonary arteries in the mid and peripheral lung zones. No pulmonary embolism is visible within main, lobar and proximal  segmental pulmonary arteries.    Moderate cardiomegaly with right and left chamber dilatation. Contrast reflux into the hepatic veins suggesting elevated right heart pressures. Thoracic aorta is normal in caliber. No pericardial effusion.    Diffuse interstitial and groundglass pulmonary edema with small right and tiny left pleural effusions. Pulmonary edema appears increased since the previous exam. No dense airspace consolidation.    Upper abdominal images showing small volume ascites and mesenteric and body wall edema.      Impression:       1.  No evidence of pulmonary embolism. Limitations as noted above.  2.  Moderate cardiomegaly. Diffuse interstitial and alveolar pulmonary edema, somewhat increased since 5/21/2020. Small pleural effusions and small volume ascites.    Signer Name: Garo Gaston MD   Signed: 5/23/2020 1:28 AM   Workstation Name: [email or web address]    [facility]    CT Cerebral Perfusion With & Without Contrast [[id number]] Collected:  05/23/20 0116     Updated:  05/23/20 0118    Narrative:       CT CEREBRAL PERFUSION, WITH AND WITHOUT CONTRAST, 5/23/2020    HISTORY:   86-year-old male in the ED with new onset right-sided weakness and right side facial droop tonight.    TECHNIQUE:   Axial CT images of the brain without and with intravenous contrast using cerebral perfusion protocol. Post-processing parametric maps were created using RAPID software and reviewed. Radiation dose reduction techniques included automated exposure control  or exposure modulation based on body size. CT and nuclear cardiology exams in the last 12 months: 3.    COMPARISON:   Noncontrast head CT tonight.    FINDINGS:   Arterial input function is nondiagnostic. The patient reportedly has heart failure and low cardiac output, and this may be contributory. This may also be secondary to poor automated assignment of the arterial and venous regions of interest.    The perfusion maps are nondiagnostic.       Impression:       Nondiagnostic examination. The study cannot be accurately assessed for acute ischemia or infarction.    Signer Name: Garo Gaston MD   Signed: 5/23/2020 1:16 AM   [facility]station [facility]    [facility]    CT Head Without Contrast Stroke Protocol [[id number]] Collected:  05/23/20 0057     Updated:  05/23/20 0059    Narrative:       CT HEAD, NONCONTRAST, STROKE PROTOCOL, 5/23/2020    HISTORY:  86-year-old male in the ED with new onset right side facial droop. Last known well at 23:30 hours.    TECHNIQUE:  CT imaging of the head without IV contrast. Radiation dose reduction techniques included automated exposure control. Radiation audit for CT and nuclear cardiology exams in the last 12 months: 2.  *  CT exam time, 00:45.  *  CT in PACS for interpretation, 00:51.  *  Stat preliminary verbal report, 00:55.    COMPARISON:  *  None.    FINDINGS:  No acute intracranial abnormality is demonstrated.    Mild to moderate generalized age-appropriate cerebral volume loss. Moderate diffuse chronic small vessel type white matter changes.    No evidence of intracranial hemorrhage, mass, mass effect, extra-axial fluid collection or hydrocephalus.      Impression:       1.  No acute intracranial abnormality. No visible acute hemorrhage.  2.  Diffuse chronic changes as noted above.    NOTIFICATION: Critical Value/emergent results were relayed from me to the ED treatment team by telephone through the CT technologist, Nicolle, at 00:55 on 5/23/2020.    Signer Name: Garo Gaston MD   Signed: 5/23/2020 12:57 AM   Workstation Name: [facility]    [facility]    XR Chest 1 View [[id number]] Collected:  05/22/20 2358     Updated:  05/23/20 0000    Narrative:       CHEST X-RAY, 5/22/2020      HISTORY:    86-year-old male hospital inpatient with shortness of breath, heart failure.      TECHNIQUE:  AP portable upright chest x-ray.    COMPARISON:  *  No  previous chest x-ray. CT chest yesterday.    FINDINGS:  Moderate cardiomegaly. Prior CABG surgery. Prominent central pulmonary arteries. Bibasilar pulmonary atelectasis. The lungs are otherwise clear. No visible pulmonary edema or focal airspace consolidation. Old left rib fractures. Surgical clips at the  base of the neck on the left.      Impression:       1.  Moderate cardiomegaly. CABG. Prominent central pulmonary arteries.  2.  Mild basilar atelectasis.    Signer Name: Garo Gaston MD   Signed: 5/22/2020 11:58 PM   Workstation Name: [email or web address]-    [facility]    CT Abdomen Pelvis Without Contrast [[id number]] Collected:  05/21/20 1000     Updated:  05/21/20 2240    Narrative:       EXAMINATION: CT CHEST WO CONTRAST-, CT ABDOMEN AND PELVIS WO CONTRAST-      INDICATION: Shortness of breath, cough.      TECHNIQUE: 5 mm unenhanced images through the chest, abdomen and pelvis.     The radiation dose reduction device was turned on for each scan per the  ALARA (As Low as Reasonably Achievable) protocol.     COMPARISON: None.     FINDINGS: Patient history indicates nonproductive cough, mild dyspnea,  history of CHF, history of prostate cancer.     CT SCAN OF THE CHEST WITHOUT CONTRAST: There is a small right pleural  effusion. There is diffuse lower lobe peribronchial thickening,  including the lingula and both lower lobes, and some patchy interstitial  disease in the left posterior costophrenic angle. No significant focal  lung disease is seen elsewhere. Heart is enlarged.  Pulmonary  vasculature is prominent.  No pericardial effusion is seen. There are  shotty calcified and noncalcified lymph nodes. Bony structures appear to  be intact.       Impression:       1. Small right pleural effusion.  2. Lower lobe peribronchial thickening and mild patchy left basilar  interstitial change, possibly a bronchitis. Appearance has low  correlation with COVID-19 pneumonia.  3. Prominent pulmonary  vasculature, at least consistent with pulmonary  venous hypertension.           ABDOMEN AND PELVIS CT SCAN WITHOUT CONTRAST: There is a small hiatal  hernia. No significant abnormalities are seen of the liver, spleen,  pancreas, adrenal glands, or kidneys except for small bilateral renal  cysts. Margins of the gallbladder are indistinct, which appears to  represent a combination of image blurring from patient motion and trace  fluid in the gallbladder fossa. There is mild ascites adjacent the liver  and spleen. No retroperitoneal adenopathy is seen. Large and small bowel  loops are normal in caliber.     Regarding the lower abdomen and pelvis, there is extensive sigmoid  diverticulosis without evidence of diverticulitis. Bladder is normally  distended and normal in appearance. There is trace intrapelvic free  fluid. No mass or adenopathy is seen. Lower pelvis is partly obscured by  streak artifact from the patient's bilateral hip prostheses. Incidental  note is made of multilevel lower lumbar degenerative disease but no  evidence of acute trauma.     IMPRESSION:   1. Very small amount of ascites.  2. Mildly indistinct appearance of the gallbladder which is thought to  be due to combination of fluid in the gallbladder fossa and image  blurring from respiratory motion. Gallbladder inflammation cannot be  entirely excluded but is not favored.  3. Sigmoid diverticulosis without evidence of diverticulitis.  4. Scattered small hiatal hernia.     D:  05/21/2020  E:  05/21/2020     This report was finalized on 5/21/2020 10:37 PM by Dr. Guicho Brewer MD.       CT Chest Without Contrast [[id number]] Collected:  05/21/20 1000     Updated:  05/21/20 2240    Narrative:       EXAMINATION: CT CHEST WO CONTRAST-, CT ABDOMEN AND PELVIS WO CONTRAST-      INDICATION: Shortness of breath, cough.      TECHNIQUE: 5 mm unenhanced images through the chest, abdomen and pelvis.     The radiation dose reduction device was turned on for each scan  per the  ALARA (As Low as Reasonably Achievable) protocol.     COMPARISON: None.     FINDINGS: Patient history indicates nonproductive cough, mild dyspnea,  history of CHF, history of prostate cancer.     CT SCAN OF THE CHEST WITHOUT CONTRAST: There is a small right pleural  effusion. There is diffuse lower lobe peribronchial thickening,  including the lingula and both lower lobes, and some patchy interstitial  disease in the left posterior costophrenic angle. No significant focal  lung disease is seen elsewhere. Heart is enlarged.  Pulmonary  vasculature is prominent.  No pericardial effusion is seen. There are  shotty calcified and noncalcified lymph nodes. Bony structures appear to  be intact.       Impression:       1. Small right pleural effusion.  2. Lower lobe peribronchial thickening and mild patchy left basilar  interstitial change, possibly a bronchitis. Appearance has low  correlation with COVID-19 pneumonia.  3. Prominent pulmonary vasculature, at least consistent with pulmonary  venous hypertension.           ABDOMEN AND PELVIS CT SCAN WITHOUT CONTRAST: There is a small hiatal  hernia. No significant abnormalities are seen of the liver, spleen,  pancreas, adrenal glands, or kidneys except for small bilateral renal  cysts. Margins of the gallbladder are indistinct, which appears to  represent a combination of image blurring from patient motion and trace  fluid in the gallbladder fossa. There is mild ascites adjacent the liver  and spleen. No retroperitoneal adenopathy is seen. Large and small bowel  loops are normal in caliber.     Regarding the lower abdomen and pelvis, there is extensive sigmoid  diverticulosis without evidence of diverticulitis. Bladder is normally  distended and normal in appearance. There is trace intrapelvic free  fluid. No mass or adenopathy is seen. Lower pelvis is partly obscured by  streak artifact from the patient's bilateral hip prostheses. Incidental  note is made of  multilevel lower lumbar degenerative disease but no  evidence of acute trauma.     IMPRESSION:   1. Very small amount of ascites.  2. Mildly indistinct appearance of the gallbladder which is thought to  be due to combination of fluid in the gallbladder fossa and image  blurring from respiratory motion. Gallbladder inflammation cannot be  entirely excluded but is not favored.  3. Sigmoid diverticulosis without evidence of diverticulitis.  4. Scattered small hiatal hernia.     D:  05/21/2020  E:  05/21/2020     This report was finalized on 5/21/2020 10:37 PM by Dr. Guicho Brewer MD.             Results for orders placed during the hospital encounter of 05/21/20   Duplex Portal Hepatic Complete CAR    Narrative EXAMINATION: DUPLEX PORTAL HEPATIC COMPLETE CAR-      INDICATION: Budd-Chiari syndrome.      COMPARISON: Unenhanced abdominal CT scan .     FINDINGS: Study is severely limited and could not be completed.  According to the technologist, the patient was dyspneic, unable to  remain still, and exam had to be performed with the patient sitting  upright on the side of the bed. Patient was unable to tolerate  completion of the study. Images obtained appear to show patent right  middle and left hepatic veins.  Flow signal appears mixed and  heterogeneous. Portal vein flow appears to be bidirectional. Study is  considered limited for evaluation of portal vein thrombus. No hepatic  vein thrombus is seen. Little other diagnostic information is available.  Incidental note is made of a small amount of ascites.       Impression Incomplete and very technically limited exam. Appearance of  bidirectional portal vein flow. Little other significant diagnostic  information is available. Consider repeat exam if/when patient is better  able to tolerate scanning.     D:  05/29/2020  E:  05/29/2020     This report was finalized on 5/30/2020 10:32 AM by Dr. Guicho Berwer MD.          Results for orders placed during the hospital encounter of  05/21/20   Duplex Portal Hepatic Complete CAR    Narrative EXAMINATION: DUPLEX PORTAL HEPATIC COMPLETE CAR-      INDICATION: Budd-Chiari syndrome.      COMPARISON: Unenhanced abdominal CT scan .     FINDINGS: Study is severely limited and could not be completed.  According to the technologist, the patient was dyspneic, unable to  remain still, and exam had to be performed with the patient sitting  upright on the side of the bed. Patient was unable to tolerate  completion of the study. Images obtained appear to show patent right  middle and left hepatic veins.  Flow signal appears mixed and  heterogeneous. Portal vein flow appears to be bidirectional. Study is  considered limited for evaluation of portal vein thrombus. No hepatic  vein thrombus is seen. Little other diagnostic information is available.  Incidental note is made of a small amount of ascites.       Impression Incomplete and very technically limited exam. Appearance of  bidirectional portal vein flow. Little other significant diagnostic  information is available. Consider repeat exam if/when patient is better  able to tolerate scanning.     D:  05/29/2020  E:  05/29/2020     This report was finalized on 5/30/2020 10:32 AM by Dr. Guicho Brewer MD.          Results for orders placed during the hospital encounter of 05/21/20   Adult Transthoracic Echo Complete W/ Cont if Necessary Per Protocol    Addendum · Left ventricular systolic function is severely decreased. Estimated EF  appears to be in the range of 21 - 25%. · There is left ventricular global hypokinesis noted. · Abnormal septal motion consistent with bundle branch block. · Right ventricular cavity is mild-to-moderately dilated. · Mildly reduced right ventricular systolic function noted. · Left atrial volume is severely increased. · Right atrial cavity size is severely dilated. · Moderate mitral valve regurgitation is present · Mild pulmonic valve regurgitation is present. · Severe tricuspid valve  regurgitation is present. The leaflets of the  tricuspid valve were noted to poorly coapt. · Estimated right ventricular systolic pressure from tricuspid  regurgitation is moderately elevated (45-55 mmHg).        Rod Terrell MD 2020  2:08 PM          Narrative · Left ventricular systolic function is severely decreased. Estimated EF   appears to be in the range of 21 - 25%.  · There is left ventricular global hypokinesis noted.  · Abnormal septal motion consistent with bundle branch block.  · Right ventricular cavity is mild-to-moderately dilated.  · Mildly reduced right ventricular systolic function noted.  · Left atrial volume is severely increased.  · Moderate mitral valve regurgitation is present  · Mild pulmonic valve regurgitation is present.  · Severe tricuspid valve regurgitation is present. The leaflets of the   tricuspid valve were noted to poorly coapt.  · Estimated right ventricular systolic pressure from tricuspid   regurgitation is moderately elevated (45-55 mmHg).            Allergies   Allergen Reactions   • Betadine [Povidone Iodine] Rash   • Penicillins Rash     Tolerated ceftriaxone 2020   • Tramadol GI Intolerance   • Tylox [Oxycodone-Acetaminophen] GI Intolerance         Discharge Disposition:             Time Spent on Discharge:  55 minutes    Electronically signed by Jennifer Oglesby MD, 20, 4:50 PM.

## 2022-10-14 NOTE — PROGRESS NOTES
Subjective   Mathew Ely is a 83 y.o. male.     History of Present Illness   83-year-old male.  Chief complaint is rash.  This is been a recurrent problem in the groin area.  Itching noted.  No fever.  The following portions of the patient's history were reviewed and updated as appropriate: allergies, current medications, past family history, past medical history, past social history, past surgical history and problem list.    Review of Systems   Constitutional: Negative for appetite change and unexpected weight change.   Skin: Positive for rash.       Objective   Physical Exam   Constitutional: He is oriented to person, place, and time. He appears well-nourished.   Neurological: He is alert and oriented to person, place, and time.   Skin: Rash (read this rash in the groin area posterior to the scrotum) noted.       Assessment/Plan   Mathew was seen today for rash.    Diagnoses and all orders for this visit:    Dermatitis  -     fluconazole (DIFLUCAN) 100 MG tablet; Take twice a week for 4 weeks      This is been a recurrent fungal rash.  Has responded really only to the fluconazole.  Refill given.         
14-Oct-2022 19:01